# Patient Record
Sex: FEMALE | Race: WHITE | HISPANIC OR LATINO | Employment: FULL TIME | URBAN - METROPOLITAN AREA
[De-identification: names, ages, dates, MRNs, and addresses within clinical notes are randomized per-mention and may not be internally consistent; named-entity substitution may affect disease eponyms.]

---

## 2017-01-19 ENCOUNTER — TELEPHONE (OUTPATIENT)
Dept: FAMILY MEDICINE CLINIC | Facility: CLINIC | Age: 27
End: 2017-01-19

## 2017-01-19 RX ORDER — DEXTROAMPHETAMINE SACCHARATE, AMPHETAMINE ASPARTATE MONOHYDRATE, DEXTROAMPHETAMINE SULFATE AND AMPHETAMINE SULFATE 7.5; 7.5; 7.5; 7.5 MG/1; MG/1; MG/1; MG/1
30 CAPSULE, EXTENDED RELEASE ORAL EVERY MORNING
Qty: 30 CAPSULE | Refills: 0 | Status: SHIPPED | OUTPATIENT
Start: 2017-01-19 | End: 2017-02-13

## 2017-01-31 LAB
AMPHETAMINES UR QL: NEGATIVE
BUPRENORPHINE SERPL-MCNC: NEGATIVE NG/ML
CANNABINOIDS SERPL QL: NEGATIVE
CBC, PLATELET CT, AND DIFF: (no result)
COCAINE SERPL CFM-MCNC: NORMAL NG/ML
EXTERNAL ABO GROUPING: (no result)
EXTERNAL AMPHETAMINE SCREEN URINE: NEGATIVE
EXTERNAL ANTIBODY SCREEN: NORMAL
EXTERNAL BARBITURATE SCREEN URINE: NORMAL
EXTERNAL BENZODIAZEPINE SCREEN URINE: NEGATIVE
EXTERNAL CHLAMYDIA SCREEN: NORMAL
EXTERNAL GC/CHLAMYDIA: NORMAL
EXTERNAL GENETIC TESTING, MATERNAL BLOOD: NORMAL
EXTERNAL GONORRHEA SCREEN: NORMAL
EXTERNAL HEPATITIS B SURFACE ANTIGEN: NEGATIVE
EXTERNAL HEPATITIS C AB: NORMAL
EXTERNAL METHADONE SCREEN URINE: NEGATIVE
EXTERNAL PHENCYCLIDINE SCREEN URINE: NORMAL
EXTERNAL PROPOXYPHENE SCREEN URINE: NORMAL
EXTERNAL RH FACTOR: POSITIVE
EXTERNAL RUBELLA QUALITATIVE: NORMAL
EXTERNAL SYPHILIS RPR SCREEN: NEGATIVE
EXTERNAL URINE CULTURE: NORMAL
HBA1C MFR BLD: 5.1 %
HIV 1+2 AB+HIV1 P24 AG SERPL QL IA: NORMAL
OPIATES UR QL: NEGATIVE
OXYCODONE UR QL SCN: NEGATIVE
RUBV IGG SERPL IA-ACNC: (no result)
TRICYCLICS UR QL SCN: NEGATIVE

## 2017-02-13 ENCOUNTER — OFFICE VISIT (OUTPATIENT)
Dept: FAMILY MEDICINE CLINIC | Facility: CLINIC | Age: 27
End: 2017-02-13

## 2017-02-13 VITALS
HEART RATE: 84 BPM | WEIGHT: 136.1 LBS | HEIGHT: 62 IN | RESPIRATION RATE: 18 BRPM | DIASTOLIC BLOOD PRESSURE: 60 MMHG | OXYGEN SATURATION: 99 % | SYSTOLIC BLOOD PRESSURE: 110 MMHG | BODY MASS INDEX: 25.04 KG/M2 | TEMPERATURE: 98.1 F

## 2017-02-13 DIAGNOSIS — M79.642 PAIN OF LEFT HAND: Primary | ICD-10-CM

## 2017-02-13 PROCEDURE — 99213 OFFICE O/P EST LOW 20 MIN: CPT | Performed by: FAMILY MEDICINE

## 2017-02-13 NOTE — PATIENT INSTRUCTIONS
This is an extremely nice 26-year-old who is here for acute pain in her left hand with inability to extend the left middle finger.  I will request a consult today with our hand specialist, Dr. Toledo.

## 2017-02-13 NOTE — PROGRESS NOTES
Subjective   Lisa Walker is a 26 y.o. female presenting with   Chief Complaint   Patient presents with   • finger issue     left hand fingers staying bent         HPI Comments: This is a 26-year-old  white female nonsmoker who just found out that she is 14 weeks pregnant today.  She tells me for one week she has had pain and inability to extend the left middle finger.  She says it is bad when she first wakes up in the morning and gets a little better throughout the day but is progressively getting worse.  She denies any trauma to the hand and she does not have any fever.  She does not have any paronychia.  She has not had any puncture wounds to the finger.  The full are aspect of the middle finger does not show any intense swelling to suggest he no synovitis, but she does have pain and swelling over the course of the flexor tendons at the metacarpal phalangeal joint       The following portions of the patient's history were reviewed and updated as appropriate: current medications, past family history, past medical history, past social history, past surgical history and problem list.    Review of Systems   Musculoskeletal: Positive for arthralgias and joint swelling.   All other systems reviewed and are negative.      Objective   Physical Exam   Constitutional: She is oriented to person, place, and time. She appears well-developed and well-nourished. No distress.   HENT:   Head: Normocephalic and atraumatic.   Eyes: EOM are normal. Pupils are equal, round, and reactive to light.   Neck: Normal range of motion. Neck supple.   Musculoskeletal: She exhibits edema and tenderness. She exhibits no deformity.        Hands:  Neurological: She is alert and oriented to person, place, and time. No cranial nerve deficit. Coordination normal.   Skin: Skin is warm and dry.   Psychiatric: She has a normal mood and affect. Her behavior is normal.   Nursing note and vitals reviewed.      Assessment/Plan   Lisa was seen  today for finger issue.    Diagnoses and all orders for this visit:    Pain of left hand  -     Ambulatory Referral to Hand Surgery                   I would like him to return for another visit in 1 year(s)

## 2017-02-17 LAB
EXTERNAL THINPREP: NORMAL
HM PAP SMEAR: NORMAL

## 2017-02-20 ENCOUNTER — TELEPHONE (OUTPATIENT)
Dept: FAMILY MEDICINE CLINIC | Facility: CLINIC | Age: 27
End: 2017-02-20

## 2017-02-20 NOTE — TELEPHONE ENCOUNTER
Pt said st her last visit it was discussed to decrease her Adderall to 15 mg due to her being pregnant being and she is wanted to  the rx

## 2017-02-21 NOTE — TELEPHONE ENCOUNTER
asifm informing pt to get us a letter from her OB dr stating it is ok for her to continue to take the Adderall while she is pregnant

## 2017-03-06 ENCOUNTER — TELEPHONE (OUTPATIENT)
Dept: FAMILY MEDICINE CLINIC | Facility: CLINIC | Age: 27
End: 2017-03-06

## 2017-03-06 RX ORDER — OSELTAMIVIR PHOSPHATE 75 MG/1
75 CAPSULE ORAL 2 TIMES DAILY
Qty: 10 CAPSULE | Refills: 0 | Status: SHIPPED | OUTPATIENT
Start: 2017-03-06 | End: 2017-04-17

## 2017-03-06 NOTE — TELEPHONE ENCOUNTER
Pt states she is 16 wks pregnant and her boyfriend was just diagnosed with the Flu and she is asking if she can have a rx for Tamiflu.   She said she called her OB dr and she is vacation

## 2017-04-27 ENCOUNTER — ROUTINE PRENATAL (OUTPATIENT)
Dept: OBSTETRICS AND GYNECOLOGY | Facility: CLINIC | Age: 27
End: 2017-04-27

## 2017-04-27 VITALS — DIASTOLIC BLOOD PRESSURE: 60 MMHG | WEIGHT: 158 LBS | BODY MASS INDEX: 28.9 KG/M2 | SYSTOLIC BLOOD PRESSURE: 112 MMHG

## 2017-04-27 DIAGNOSIS — Z34.92 NORMAL PREGNANCY, SECOND TRIMESTER: Primary | ICD-10-CM

## 2017-04-27 DIAGNOSIS — Z13.9 SCREENING: ICD-10-CM

## 2017-04-27 LAB
BILIRUB BLD-MCNC: NEGATIVE MG/DL
CLARITY, POC: CLEAR
COLOR UR: YELLOW
GLUCOSE UR STRIP-MCNC: NEGATIVE MG/DL
KETONES UR QL: NEGATIVE
LEUKOCYTE EST, POC: NEGATIVE
NITRITE UR-MCNC: NEGATIVE MG/ML
PH UR: 5 [PH] (ref 5–8)
PROT UR STRIP-MCNC: NEGATIVE MG/DL
RBC # UR STRIP: NEGATIVE /UL
SP GR UR: 1.01 (ref 1–1.03)
UROBILINOGEN UR QL: NORMAL

## 2017-04-27 PROCEDURE — 81002 URINALYSIS NONAUTO W/O SCOPE: CPT | Performed by: OBSTETRICS & GYNECOLOGY

## 2017-04-27 PROCEDURE — 0502F SUBSEQUENT PRENATAL CARE: CPT | Performed by: OBSTETRICS & GYNECOLOGY

## 2017-04-27 NOTE — PROGRESS NOTES
Chief Complaint   Patient presents with   • Routine Prenatal Visit   Complains of HA, tylenol helps some  Exam as above  IMP: IUP at 24 weeks  Plan:  2 HR GTT next visit

## 2017-05-25 ENCOUNTER — ROUTINE PRENATAL (OUTPATIENT)
Dept: OBSTETRICS AND GYNECOLOGY | Facility: CLINIC | Age: 27
End: 2017-05-25

## 2017-05-25 VITALS — WEIGHT: 162 LBS | BODY MASS INDEX: 29.63 KG/M2 | SYSTOLIC BLOOD PRESSURE: 118 MMHG | DIASTOLIC BLOOD PRESSURE: 60 MMHG

## 2017-05-25 DIAGNOSIS — Z3A.28 28 WEEKS GESTATION OF PREGNANCY: Primary | ICD-10-CM

## 2017-05-25 DIAGNOSIS — Z34.93 NORMAL PREGNANCY, THIRD TRIMESTER: ICD-10-CM

## 2017-05-25 LAB
GLUCOSE 1H P 75 G GLC PO SERPL-MCNC: 93 MG/DL
GLUCOSE 2H P 75 G GLC PO SERPL-MCNC: 96 MG/DL
GLUCOSE P FAST SERPL-MCNC: 74 MG/DL
HCT VFR BLD AUTO: 34.1 % (ref 35.6–45.5)
HGB BLD-MCNC: 11.3 G/DL (ref 11.9–15.5)

## 2017-05-25 PROCEDURE — 0502F SUBSEQUENT PRENATAL CARE: CPT | Performed by: OBSTETRICS & GYNECOLOGY

## 2017-06-08 ENCOUNTER — ROUTINE PRENATAL (OUTPATIENT)
Dept: OBSTETRICS AND GYNECOLOGY | Facility: CLINIC | Age: 27
End: 2017-06-08

## 2017-06-08 VITALS — BODY MASS INDEX: 29.81 KG/M2 | WEIGHT: 163 LBS | SYSTOLIC BLOOD PRESSURE: 118 MMHG | DIASTOLIC BLOOD PRESSURE: 60 MMHG

## 2017-06-08 DIAGNOSIS — Z13.9 SCREENING: ICD-10-CM

## 2017-06-08 DIAGNOSIS — Z34.92 NORMAL PREGNANCY, SECOND TRIMESTER: Primary | ICD-10-CM

## 2017-06-08 LAB
BILIRUB BLD-MCNC: NEGATIVE MG/DL
CLARITY, POC: CLEAR
COLOR UR: YELLOW
GLUCOSE UR STRIP-MCNC: NEGATIVE MG/DL
KETONES UR QL: NEGATIVE
LEUKOCYTE EST, POC: NEGATIVE
NITRITE UR-MCNC: NEGATIVE MG/ML
PH UR: 7 [PH] (ref 5–8)
PROT UR STRIP-MCNC: NEGATIVE MG/DL
RBC # UR STRIP: NEGATIVE /UL
SP GR UR: 1.01 (ref 1–1.03)
UROBILINOGEN UR QL: NORMAL

## 2017-06-08 PROCEDURE — 0502F SUBSEQUENT PRENATAL CARE: CPT | Performed by: OBSTETRICS & GYNECOLOGY

## 2017-06-08 PROCEDURE — 81002 URINALYSIS NONAUTO W/O SCOPE: CPT | Performed by: OBSTETRICS & GYNECOLOGY

## 2017-06-08 NOTE — PROGRESS NOTES
OB follow up     Lisa Walker is a 26 y.o.  30w2d being seen today for her obstetrical visit.  Patient reports no complaints. Fetal movement: normal.    Review of Systems  No bleeding, No cramping/contractions     /60  Wt 163 lb (73.9 kg)  LMP 2016  BMI 29.81 kg/m2    FHT:   BPM   Uterine Size:         Assessment/Plan:    1) 26 y.o.  -pregnancy at 30w2d  Reviewed this stage of pregnancy  Problem list updated   Return in about 2 weeks (around 2017) for OB Tummy.      Ethan Myers MD    2017  3:50 PM

## 2017-06-27 ENCOUNTER — ROUTINE PRENATAL (OUTPATIENT)
Dept: OBSTETRICS AND GYNECOLOGY | Facility: CLINIC | Age: 27
End: 2017-06-27

## 2017-06-27 VITALS — BODY MASS INDEX: 29.81 KG/M2 | WEIGHT: 163 LBS | SYSTOLIC BLOOD PRESSURE: 110 MMHG | DIASTOLIC BLOOD PRESSURE: 70 MMHG

## 2017-06-27 DIAGNOSIS — Z13.9 SCREENING: Primary | ICD-10-CM

## 2017-06-27 PROCEDURE — 0502F SUBSEQUENT PRENATAL CARE: CPT | Performed by: OBSTETRICS & GYNECOLOGY

## 2017-06-28 NOTE — PROGRESS NOTES
OB follow up     Chief Complaint: PNC Fu    Lisa Walker is a 26 y.o.  33w1d being seen today for her obstetrical visit.  Patient reports no complaints. Fetal movement: normal. Reporting some LE swelling. Pt working full time. States swelling is worse at end of day    Review of Systems  No bleeding, No cramping/contractions     /70  Wt 163 lb (73.9 kg)  LMP 2016  BMI 29.81 kg/m2    FHT: present   Uterine Size: 33cm       Assessment    1) pregnancy at 33w1d    2) LE swelling: mild swelling of feet and calf noted. Discussed support hose and lifting legs above level of heart.      Plan    Reviewed this stage of pregnancy  Problem list updated   Return in about 2 weeks (around 2017).      Carlita Holt, DO    2017  1:11 PM

## 2017-07-13 ENCOUNTER — ROUTINE PRENATAL (OUTPATIENT)
Dept: OBSTETRICS AND GYNECOLOGY | Facility: CLINIC | Age: 27
End: 2017-07-13

## 2017-07-13 ENCOUNTER — PROCEDURE VISIT (OUTPATIENT)
Dept: OBSTETRICS AND GYNECOLOGY | Facility: CLINIC | Age: 27
End: 2017-07-13

## 2017-07-13 VITALS — SYSTOLIC BLOOD PRESSURE: 112 MMHG | WEIGHT: 170 LBS | BODY MASS INDEX: 31.09 KG/M2 | DIASTOLIC BLOOD PRESSURE: 70 MMHG

## 2017-07-13 DIAGNOSIS — O32.0XX0: Primary | ICD-10-CM

## 2017-07-13 DIAGNOSIS — O32.8XX0: ICD-10-CM

## 2017-07-13 DIAGNOSIS — Z34.93 NORMAL PREGNANCY, THIRD TRIMESTER: Primary | ICD-10-CM

## 2017-07-13 PROCEDURE — 0502F SUBSEQUENT PRENATAL CARE: CPT | Performed by: OBSTETRICS & GYNECOLOGY

## 2017-07-13 PROCEDURE — 76816 OB US FOLLOW-UP PER FETUS: CPT | Performed by: OBSTETRICS & GYNECOLOGY

## 2017-07-13 NOTE — PROGRESS NOTES
OB follow up     Lisa Walker is a 26 y.o.  35w2d being seen today for her obstetrical visit.  Patient reports no complaints. Fetal movement: normal.    Review of Systems  No bleeding, No cramping/contractions     /70  Wt 170 lb (77.1 kg)  LMP 2016  BMI 31.09 kg/m2    FHT: present BPM   Uterine Size:       Abdomen is soft, nontender.  Cervical exam was done today and no presenting part was felt.  GBS was collected.    Assessment/Plan:    1) 26 y.o.  -pregnancy at 35w2d  2) No presenting part - check US for position today  3) GBS was collected and sent    Reviewed this stage of pregnancy, labor warnings reviewed.   Problem list updated   Return in about 7 days (around 2017) for OB INT.      Ethan Myers MD    2017  10:14 AM

## 2017-07-17 LAB — B-HEM STREP SPEC QL CULT: NEGATIVE

## 2017-07-20 ENCOUNTER — ROUTINE PRENATAL (OUTPATIENT)
Dept: OBSTETRICS AND GYNECOLOGY | Facility: CLINIC | Age: 27
End: 2017-07-20

## 2017-07-20 VITALS — DIASTOLIC BLOOD PRESSURE: 64 MMHG | BODY MASS INDEX: 30.91 KG/M2 | SYSTOLIC BLOOD PRESSURE: 112 MMHG | WEIGHT: 169 LBS

## 2017-07-20 DIAGNOSIS — Z34.93 NORMAL PREGNANCY, THIRD TRIMESTER: Primary | ICD-10-CM

## 2017-07-20 PROCEDURE — 0502F SUBSEQUENT PRENATAL CARE: CPT | Performed by: OBSTETRICS & GYNECOLOGY

## 2017-07-20 NOTE — PROGRESS NOTES
OB follow up     Lisa Walker is a 26 y.o.  36w2d being seen today for her obstetrical visit.  Patient reports no complaints. Fetal movement: normal.    Review of Systems  No bleeding, No cramping/contractions     /64  Wt 169 lb (76.7 kg)  LMP 2016  BMI 30.91 kg/m2    FHT: 150 BPM   Uterine Size: 36 cm     GBS is negative    Assessment/Plan:    1) 26 y.o.  -pregnancy at 36w2d  Reviewed this stage of pregnancy  Problem list updated   Return in about 7 days (around 2017) for OB INT.      Ethan Myers MD    2017  4:29 PM

## 2017-08-01 ENCOUNTER — ROUTINE PRENATAL (OUTPATIENT)
Dept: OBSTETRICS AND GYNECOLOGY | Facility: CLINIC | Age: 27
End: 2017-08-01

## 2017-08-01 VITALS — BODY MASS INDEX: 31.09 KG/M2 | WEIGHT: 170 LBS | DIASTOLIC BLOOD PRESSURE: 72 MMHG | SYSTOLIC BLOOD PRESSURE: 100 MMHG

## 2017-08-01 DIAGNOSIS — Z34.93 NORMAL PREGNANCY, THIRD TRIMESTER: Primary | ICD-10-CM

## 2017-08-01 PROCEDURE — 0502F SUBSEQUENT PRENATAL CARE: CPT | Performed by: OBSTETRICS & GYNECOLOGY

## 2017-08-01 NOTE — PROGRESS NOTES
OB follow up     Lisa Walker is a 26 y.o.  38w0d being seen today for her obstetrical visit.  Patient reports no complaints. Fetal movement: normal.    Review of Systems  No bleeding, No cramping/contractions     /72  Wt 170 lb (77.1 kg)  LMP 2016  BMI 31.09 kg/m2    FHT: 150 BPM   Uterine Size: 38 cm       Assessment/Plan:    1) 26 y.o.  -pregnancy at 38w0d  Reviewed this stage of pregnancy  Problem list updated   Return in about 7 days (around 2017) for OB INT.      Ethan Myers MD    2017  4:30 PM

## 2017-08-10 ENCOUNTER — ROUTINE PRENATAL (OUTPATIENT)
Dept: OBSTETRICS AND GYNECOLOGY | Facility: CLINIC | Age: 27
End: 2017-08-10

## 2017-08-10 VITALS — BODY MASS INDEX: 30.73 KG/M2 | SYSTOLIC BLOOD PRESSURE: 102 MMHG | DIASTOLIC BLOOD PRESSURE: 64 MMHG | WEIGHT: 168 LBS

## 2017-08-10 DIAGNOSIS — Z34.93 NORMAL PREGNANCY, THIRD TRIMESTER: Primary | ICD-10-CM

## 2017-08-10 PROCEDURE — 0502F SUBSEQUENT PRENATAL CARE: CPT | Performed by: OBSTETRICS & GYNECOLOGY

## 2017-08-10 NOTE — PROGRESS NOTES
OB follow up     Lisa Walker is a 26 y.o.  39w2d being seen today for her obstetrical visit.  Patient reports occasional contractions. Fetal movement: normal.    Review of Systems  No bleeding, No cramping/contractions     /64  Wt 168 lb (76.2 kg)  LMP 2016  BMI 30.73 kg/m2    FHT: present BPM   Uterine Size: 38 cm       Assessment/Plan:    1) 26 y.o.  -pregnancy at 39w2d    Reviewed this stage of pregnancy  Problem list updated   Return in about 7 days (around 2017) for OB INT.      Ethan Myers MD    8/10/2017  11:14 AM

## 2017-08-17 ENCOUNTER — HISTORY (OUTPATIENT)
Dept: OBSTETRICS AND GYNECOLOGY | Facility: HOSPITAL | Age: 27
End: 2017-08-17
Attending: OBSTETRICS & GYNECOLOGY

## 2017-08-17 ENCOUNTER — ROUTINE PRENATAL (OUTPATIENT)
Dept: OBSTETRICS AND GYNECOLOGY | Facility: CLINIC | Age: 27
End: 2017-08-17

## 2017-08-17 ENCOUNTER — HOSPITAL ENCOUNTER (OUTPATIENT)
Facility: HOSPITAL | Age: 27
Discharge: HOME | End: 2017-08-17
Attending: OBSTETRICS & GYNECOLOGY | Admitting: OBSTETRICS & GYNECOLOGY

## 2017-08-17 VITALS
RESPIRATION RATE: 18 BRPM | DIASTOLIC BLOOD PRESSURE: 62 MMHG | HEART RATE: 85 BPM | SYSTOLIC BLOOD PRESSURE: 113 MMHG | TEMPERATURE: 98.4 F

## 2017-08-17 VITALS — WEIGHT: 170 LBS | DIASTOLIC BLOOD PRESSURE: 78 MMHG | SYSTOLIC BLOOD PRESSURE: 104 MMHG | BODY MASS INDEX: 31.09 KG/M2

## 2017-08-17 DIAGNOSIS — Z13.9 SCREENING: ICD-10-CM

## 2017-08-17 DIAGNOSIS — Z34.93 NORMAL PREGNANCY, THIRD TRIMESTER: Primary | ICD-10-CM

## 2017-08-17 LAB
BILIRUB BLD-MCNC: NEGATIVE MG/DL
BILIRUB BLD-MCNC: NEGATIVE MG/DL
CLARITY, POC: CLEAR
CLARITY, POC: CLEAR
COLOR UR: YELLOW
COLOR UR: YELLOW
GLUCOSE UR STRIP-MCNC: NEGATIVE MG/DL
GLUCOSE UR STRIP-MCNC: NEGATIVE MG/DL
KETONES UR QL: ABNORMAL
KETONES UR QL: NEGATIVE
LEUKOCYTE EST, POC: NEGATIVE
LEUKOCYTE EST, POC: NEGATIVE
NITRITE UR-MCNC: NEGATIVE MG/ML
NITRITE UR-MCNC: NEGATIVE MG/ML
PH UR: 6 [PH] (ref 5–8)
PROT UR STRIP-MCNC: NEGATIVE MG/DL
PROT UR STRIP-MCNC: NEGATIVE MG/DL
RBC # UR STRIP: NEGATIVE /UL
RBC # UR STRIP: NEGATIVE /UL
SP GR UR: 1.01 (ref 1–1.03)
UROBILINOGEN UR QL: NORMAL
UROBILINOGEN UR QL: NORMAL

## 2017-08-17 PROCEDURE — 59025 FETAL NON-STRESS TEST: CPT

## 2017-08-17 PROCEDURE — 59426 ANTEPARTUM CARE ONLY: CPT | Performed by: OBSTETRICS & GYNECOLOGY

## 2017-08-17 PROCEDURE — 81002 URINALYSIS NONAUTO W/O SCOPE: CPT | Performed by: OBSTETRICS & GYNECOLOGY

## 2017-08-17 PROCEDURE — 59025 FETAL NON-STRESS TEST: CPT | Performed by: OBSTETRICS & GYNECOLOGY

## 2017-08-17 PROCEDURE — G0463 HOSPITAL OUTPT CLINIC VISIT: HCPCS

## 2017-08-17 NOTE — PROGRESS NOTES
OB follow up     Lisa Walker is a 26 y.o.  40w2d being seen today for her obstetrical visit.  Patient reports no bleeding, no leaking and occasional contractions. Fetal movement: normal.    Review of Systems  No bleeding, No cramping/contractions     /78  Wt 170 lb (77.1 kg)  LMP 2016  BMI 31.09 kg/m2    FHT: present BPM   Uterine Size: 38 cm       Assessment/Plan:    1) 26 y.o.  -pregnancy at 40w2d    2) IOL > 41 weeks  Encounter Diagnoses   Name Primary?   • Normal pregnancy, third trimester Yes   • Screening        3) Reviewed this stage of pregnancy  4) Problem list updated     No Follow-up on file.      Ethan Myers MD    2017  1:15 PM

## 2017-08-17 NOTE — NON STRESS TEST
Lisa Walker, a  at 40w2d with an JAEL of 8/15/2017, by Ultrasound, was seen at [facility] for a nonstress test. SVE 3, membranes intact.  Chief Complaint   Patient presents with   • Contractions   • leaking   • leaking fluid       Interpretation A  Nonstress Test Interpretation A: Reactive (17 1056 : Syeda Ny RN)        Lisa Walker  41w1d  Problem List Items Addressed This Visit     None          Chief Complaint   Patient presents with   • Contractions   • leaking   • leaking fluid        HPI:  Pt thinks she  her water earlier today.  HPI         Patient Active Problem List   Diagnosis   • Attention or concentration deficit   • Pain of left hand   • Vaginal delivery           Review of Systems -   Psychological ROS: negative  Ophthalmic ROS: negative  ENT ROS: negative  Allergy and Immunology ROS: negative  Hematological and Lymphatic ROS: negative  Endocrine ROS: negative  Breast ROS: negative for breast lumps  Respiratory ROS: no cough, shortness of breath, or wheezing  Cardiovascular ROS: no chest pain or dyspnea on exertion  Gastrointestinal ROS: no abdominal pain, change in bowel habits, or black or bloody stools  Genito-Urinary ROS: no dysuria, trouble voiding, or hematuria  Musculoskeletal ROS: negative  Neurological ROS: no TIA or stroke symptoms  Dermatological ROS: negative        NST INTERPRETATION  Indication for test: labor  Interpretation: reactive  Fetal Heart Rate Baseline: 140's  Periodic Changes: present  Contractions present? rare        Physical Examination: General appearance - alert, well appearing, and in no distress  Mental status - alert, oriented to person, place, and time  Abdomen - soft, nontender, nondistended, no masses or organomegaly  Neurological - alert, oriented, normal speech, no focal findings or movement disorder noted  Musculoskeletal - no joint tenderness, deformity or swelling      CERVICAL EXAM: as  above    Resume normal activity as tolerated.    Please keep your next regularly scheduled appointment.    Call your doctor if you notice: increased leakage or fluid from the vagina, contractions more than 10 per 1 hour, decreased fetal movement, low back pain or cramping that doesn't go away, bleeding from vaginal area, difficulty peeing, pain with peeing, difficulty breathing, new calf pain, headache that doesn't go away or vision change.    Normal diet as tolerated.    Perform a kick count once a day at approximately the same time each day. Baby's activity levels are usually higher in the evening after dinner.To perform a kick count, lie on your left side and focus on your baby's movements: rolls, kicks, or flutters. Record the number of minutes it takes to feel your baby move ten times. You may stop counting after your baby has moved 5 times.    If your baby does not move at least 5 times in 1 hr or if there is a sudden decrease in movement, call the office ([phone])    Patient Active Problem List   Diagnosis   • Attention or concentration deficit   • Pain of left hand   • Vaginal delivery       [unfilled]    IMP: false labor    No diagnosis found.    PLAN: RETURN TO OFFICE AS SCHEDULED, CALL FOR PROBLEMS.    For billing purposes 15 out of 15 minutes spent counseling face to face with pt explaining signs and symptoms of labor, when to call; instructions and precautions given.     Derek Charles MD  9/3/2017  8:19 PM

## 2017-08-17 NOTE — PROGRESS NOTES
CC:  Went to hospital this morning, had leakage and thought her water had broke.  Sent home after evaluation, 1 cm dilated. rlr

## 2017-08-18 ENCOUNTER — HOSPITAL ENCOUNTER (OUTPATIENT)
Facility: HOSPITAL | Age: 27
Discharge: HOME | End: 2017-08-18
Attending: OBSTETRICS & GYNECOLOGY | Admitting: OBSTETRICS & GYNECOLOGY

## 2017-08-18 ENCOUNTER — HOSPITAL ENCOUNTER (OUTPATIENT)
Dept: LABOR AND DELIVERY | Facility: HOSPITAL | Age: 27
Discharge: HOME | End: 2017-08-18

## 2017-08-18 VITALS
SYSTOLIC BLOOD PRESSURE: 100 MMHG | HEART RATE: 63 BPM | RESPIRATION RATE: 18 BRPM | DIASTOLIC BLOOD PRESSURE: 57 MMHG | BODY MASS INDEX: 31.28 KG/M2 | WEIGHT: 170 LBS | TEMPERATURE: 98 F | OXYGEN SATURATION: 99 % | HEIGHT: 62 IN

## 2017-08-18 PROBLEM — Z37.9 NORMAL LABOR: Status: ACTIVE | Noted: 2017-08-18

## 2017-08-18 LAB
ABO GROUP BLD: NORMAL
ABO GROUP BLD: NORMAL
BLD GP AB SCN SERPL QL: NEGATIVE
DEPRECATED RDW RBC AUTO: 45.2 FL (ref 37–54)
ERYTHROCYTE [DISTWIDTH] IN BLOOD BY AUTOMATED COUNT: 13.2 % (ref 11.5–14.5)
HCT VFR BLD AUTO: 36.6 % (ref 37–47)
HGB BLD-MCNC: 12.3 G/DL (ref 12–16)
MCH RBC QN AUTO: 31.3 PG (ref 27–31)
MCHC RBC AUTO-ENTMCNC: 33.6 G/DL (ref 31–37)
MCV RBC AUTO: 93.1 FL (ref 81–99)
PLATELET # BLD AUTO: 202 10*3/MM3 (ref 140–500)
PMV BLD AUTO: 12.3 FL (ref 7.4–10.4)
RBC # BLD AUTO: 3.93 10*6/MM3 (ref 4.2–5.4)
RH BLD: POSITIVE
RH BLD: POSITIVE
WBC NRBC COR # BLD: 9.73 10*3/MM3 (ref 4.8–10.8)

## 2017-08-18 PROCEDURE — 86901 BLOOD TYPING SEROLOGIC RH(D): CPT

## 2017-08-18 PROCEDURE — 36415 COLL VENOUS BLD VENIPUNCTURE: CPT | Performed by: OBSTETRICS & GYNECOLOGY

## 2017-08-18 PROCEDURE — 59025 FETAL NON-STRESS TEST: CPT

## 2017-08-18 PROCEDURE — 96361 HYDRATE IV INFUSION ADD-ON: CPT

## 2017-08-18 PROCEDURE — 85027 COMPLETE CBC AUTOMATED: CPT | Performed by: OBSTETRICS & GYNECOLOGY

## 2017-08-18 PROCEDURE — 96360 HYDRATION IV INFUSION INIT: CPT

## 2017-08-18 PROCEDURE — 0502F SUBSEQUENT PRENATAL CARE: CPT | Performed by: OBSTETRICS & GYNECOLOGY

## 2017-08-18 PROCEDURE — 86900 BLOOD TYPING SEROLOGIC ABO: CPT | Performed by: OBSTETRICS & GYNECOLOGY

## 2017-08-18 PROCEDURE — 86850 RBC ANTIBODY SCREEN: CPT | Performed by: OBSTETRICS & GYNECOLOGY

## 2017-08-18 PROCEDURE — G0463 HOSPITAL OUTPT CLINIC VISIT: HCPCS

## 2017-08-18 PROCEDURE — 86901 BLOOD TYPING SEROLOGIC RH(D): CPT | Performed by: OBSTETRICS & GYNECOLOGY

## 2017-08-18 PROCEDURE — 86900 BLOOD TYPING SEROLOGIC ABO: CPT

## 2017-08-18 RX ORDER — OXYTOCIN/RINGER'S LACTATE 20/1000 ML
2 PLASTIC BAG, INJECTION (ML) INTRAVENOUS CONTINUOUS
Status: CANCELLED | OUTPATIENT
Start: 2017-08-18

## 2017-08-18 RX ORDER — SODIUM CHLORIDE 0.9 % (FLUSH) 0.9 %
1-10 SYRINGE (ML) INJECTION AS NEEDED
Status: DISCONTINUED | OUTPATIENT
Start: 2017-08-18 | End: 2017-08-18

## 2017-08-18 RX ORDER — SODIUM CHLORIDE, SODIUM LACTATE, POTASSIUM CHLORIDE, CALCIUM CHLORIDE 600; 310; 30; 20 MG/100ML; MG/100ML; MG/100ML; MG/100ML
125 INJECTION, SOLUTION INTRAVENOUS CONTINUOUS
Status: DISCONTINUED | OUTPATIENT
Start: 2017-08-18 | End: 2017-08-18

## 2017-08-18 RX ORDER — LIDOCAINE HYDROCHLORIDE 10 MG/ML
5 INJECTION, SOLUTION INFILTRATION; PERINEURAL AS NEEDED
Status: DISCONTINUED | OUTPATIENT
Start: 2017-08-18 | End: 2017-08-18

## 2017-08-18 RX ORDER — MISOPROSTOL 100 UG/1
800 TABLET ORAL AS NEEDED
Status: CANCELLED | OUTPATIENT
Start: 2017-08-18

## 2017-08-18 RX ORDER — METHYLERGONOVINE MALEATE 0.2 MG/ML
200 INJECTION INTRAVENOUS ONCE AS NEEDED
Status: CANCELLED | OUTPATIENT
Start: 2017-08-18

## 2017-08-18 RX ORDER — CARBOPROST TROMETHAMINE 250 UG/ML
250 INJECTION, SOLUTION INTRAMUSCULAR AS NEEDED
Status: CANCELLED | OUTPATIENT
Start: 2017-08-18

## 2017-08-18 RX ORDER — OXYTOCIN/RINGER'S LACTATE 20/1000 ML
2-30 PLASTIC BAG, INJECTION (ML) INTRAVENOUS
Status: DISCONTINUED | OUTPATIENT
Start: 2017-08-18 | End: 2017-08-18

## 2017-08-18 RX ADMIN — SODIUM CHLORIDE, POTASSIUM CHLORIDE, SODIUM LACTATE AND CALCIUM CHLORIDE 125 ML/HR: 600; 310; 30; 20 INJECTION, SOLUTION INTRAVENOUS at 05:49

## 2017-08-19 ENCOUNTER — HOSPITAL ENCOUNTER (OUTPATIENT)
Facility: HOSPITAL | Age: 27
Discharge: HOME | End: 2017-08-19
Attending: OBSTETRICS & GYNECOLOGY | Admitting: OBSTETRICS & GYNECOLOGY

## 2017-08-19 VITALS
HEIGHT: 62 IN | TEMPERATURE: 98.3 F | WEIGHT: 170 LBS | OXYGEN SATURATION: 99 % | BODY MASS INDEX: 31.28 KG/M2 | HEART RATE: 67 BPM | RESPIRATION RATE: 18 BRPM

## 2017-08-19 PROCEDURE — G0463 HOSPITAL OUTPT CLINIC VISIT: HCPCS

## 2017-08-19 PROCEDURE — 59025 FETAL NON-STRESS TEST: CPT

## 2017-08-19 PROCEDURE — 59025 FETAL NON-STRESS TEST: CPT | Performed by: OBSTETRICS & GYNECOLOGY

## 2017-08-19 NOTE — NURSING NOTE
Dr. Hardy said to discharge pt to home with labor warning signs. Pt to have scheduled induction on Wednesday.

## 2017-08-19 NOTE — NURSING NOTE
Pt discharge to home undelivered. Pt given discharge instructions with labor warning signs. Pt to have scheduled induction on Wednesday.

## 2017-08-19 NOTE — NON STRESS TEST
Lisa Walker, a  at 40w4d with an JAEL of 8/15/2017, by Ultrasound, was seen at [facility] for a nonstress test.    Chief Complaint   Patient presents with   • leaking of fluid     Pt here for rule out rupture of membranes. nitrazine negative. Vag exam 1cm/20/-2 and posterior. Pt had an occasional contx and uterine irritability while on fetal monitor.  with accels and reactive.  Pt desires to go natural but is scheduled for induction on Wednesday. Pt discharged to home undelivered.  Interpretation A  Nonstress Test Interpretation A: Reactive (17 0940 : Yael Raphael RN)

## 2017-08-23 ENCOUNTER — HOSPITAL ENCOUNTER (OUTPATIENT)
Dept: LABOR AND DELIVERY | Facility: HOSPITAL | Age: 27
Discharge: HOME | End: 2017-08-23

## 2017-08-23 ENCOUNTER — ANESTHESIA (OUTPATIENT)
Dept: OBSTETRICS AND GYNECOLOGY | Facility: HOSPITAL | Age: 27
End: 2017-08-23

## 2017-08-23 ENCOUNTER — HOSPITAL ENCOUNTER (INPATIENT)
Facility: HOSPITAL | Age: 27
LOS: 2 days | Discharge: HOME | End: 2017-08-25
Attending: OBSTETRICS & GYNECOLOGY | Admitting: OBSTETRICS & GYNECOLOGY

## 2017-08-23 ENCOUNTER — ANESTHESIA EVENT (OUTPATIENT)
Dept: OBSTETRICS AND GYNECOLOGY | Facility: HOSPITAL | Age: 27
End: 2017-08-23

## 2017-08-23 LAB
ABO GROUP BLD: NORMAL
BLD GP AB SCN SERPL QL: NEGATIVE
DEPRECATED RDW RBC AUTO: 45.1 FL (ref 37–54)
ERYTHROCYTE [DISTWIDTH] IN BLOOD BY AUTOMATED COUNT: 13.2 % (ref 11.5–14.5)
HCT VFR BLD AUTO: 37.4 % (ref 37–47)
HGB BLD-MCNC: 12.5 G/DL (ref 12–16)
MCH RBC QN AUTO: 31.3 PG (ref 27–31)
MCHC RBC AUTO-ENTMCNC: 33.4 G/DL (ref 31–37)
MCV RBC AUTO: 93.7 FL (ref 81–99)
PLATELET # BLD AUTO: 202 10*3/MM3 (ref 140–500)
PMV BLD AUTO: 12.2 FL (ref 7.4–10.4)
RBC # BLD AUTO: 3.99 10*6/MM3 (ref 4.2–5.4)
RH BLD: POSITIVE
WBC NRBC COR # BLD: 9.81 10*3/MM3 (ref 4.8–10.8)

## 2017-08-23 PROCEDURE — 86850 RBC ANTIBODY SCREEN: CPT | Performed by: OBSTETRICS & GYNECOLOGY

## 2017-08-23 PROCEDURE — C1755 CATHETER, INTRASPINAL: HCPCS | Performed by: NURSE ANESTHETIST, CERTIFIED REGISTERED

## 2017-08-23 PROCEDURE — 86901 BLOOD TYPING SEROLOGIC RH(D): CPT | Performed by: OBSTETRICS & GYNECOLOGY

## 2017-08-23 PROCEDURE — 86900 BLOOD TYPING SEROLOGIC ABO: CPT | Performed by: OBSTETRICS & GYNECOLOGY

## 2017-08-23 PROCEDURE — 99024 POSTOP FOLLOW-UP VISIT: CPT | Performed by: OBSTETRICS & GYNECOLOGY

## 2017-08-23 PROCEDURE — 59410 OBSTETRICAL CARE: CPT | Performed by: OBSTETRICS & GYNECOLOGY

## 2017-08-23 PROCEDURE — 85027 COMPLETE CBC AUTOMATED: CPT | Performed by: OBSTETRICS & GYNECOLOGY

## 2017-08-23 PROCEDURE — 25010000002 FENTANYL CITRATE (PF) 100 MCG/2ML SOLUTION: Performed by: OBSTETRICS & GYNECOLOGY

## 2017-08-23 PROCEDURE — 0KQM0ZZ REPAIR PERINEUM MUSCLE, OPEN APPROACH: ICD-10-PCS | Performed by: OBSTETRICS & GYNECOLOGY

## 2017-08-23 RX ORDER — OXYTOCIN/RINGER'S LACTATE 20/1000 ML
PLASTIC BAG, INJECTION (ML) INTRAVENOUS
Status: COMPLETED
Start: 2017-08-23 | End: 2017-08-23

## 2017-08-23 RX ORDER — MAGNESIUM CARB/ALUMINUM HYDROX 105-160MG
TABLET,CHEWABLE ORAL
Status: DISPENSED
Start: 2017-08-23 | End: 2017-08-24

## 2017-08-23 RX ORDER — LIDOCAINE HYDROCHLORIDE AND EPINEPHRINE 15; 5 MG/ML; UG/ML
INJECTION, SOLUTION EPIDURAL AS NEEDED
Status: DISCONTINUED | OUTPATIENT
Start: 2017-08-23 | End: 2017-08-24 | Stop reason: SURG

## 2017-08-23 RX ORDER — FENTANYL CITRATE 50 UG/ML
100 INJECTION, SOLUTION INTRAMUSCULAR; INTRAVENOUS
Status: DISCONTINUED | OUTPATIENT
Start: 2017-08-23 | End: 2017-08-24

## 2017-08-23 RX ORDER — SODIUM CHLORIDE 0.9 % (FLUSH) 0.9 %
1-10 SYRINGE (ML) INJECTION AS NEEDED
Status: DISCONTINUED | OUTPATIENT
Start: 2017-08-23 | End: 2017-08-24

## 2017-08-23 RX ORDER — OXYTOCIN/RINGER'S LACTATE 20/1000 ML
2 PLASTIC BAG, INJECTION (ML) INTRAVENOUS
Status: DISCONTINUED | OUTPATIENT
Start: 2017-08-23 | End: 2017-08-24

## 2017-08-23 RX ORDER — LIDOCAINE HYDROCHLORIDE 10 MG/ML
5 INJECTION, SOLUTION INFILTRATION; PERINEURAL AS NEEDED
Status: DISCONTINUED | OUTPATIENT
Start: 2017-08-23 | End: 2017-08-24

## 2017-08-23 RX ORDER — SUFENTANIL CITRATE 50 UG/ML
INJECTION EPIDURAL; INTRAVENOUS
Status: DISPENSED
Start: 2017-08-23 | End: 2017-08-24

## 2017-08-23 RX ORDER — SODIUM CHLORIDE, SODIUM LACTATE, POTASSIUM CHLORIDE, CALCIUM CHLORIDE 600; 310; 30; 20 MG/100ML; MG/100ML; MG/100ML; MG/100ML
125 INJECTION, SOLUTION INTRAVENOUS CONTINUOUS
Status: DISCONTINUED | OUTPATIENT
Start: 2017-08-23 | End: 2017-08-24

## 2017-08-23 RX ADMIN — Medication 2 MILLI-UNITS/MIN: at 06:45

## 2017-08-23 RX ADMIN — SODIUM CHLORIDE, POTASSIUM CHLORIDE, SODIUM LACTATE AND CALCIUM CHLORIDE 101 ML/HR: 600; 310; 30; 20 INJECTION, SOLUTION INTRAVENOUS at 13:41

## 2017-08-23 RX ADMIN — LIDOCAINE HYDROCHLORIDE,EPINEPHRINE BITARTRATE 2 ML: 15; .005 INJECTION, SOLUTION EPIDURAL; INFILTRATION; INTRACAUDAL; PERINEURAL at 15:45

## 2017-08-23 RX ADMIN — FENTANYL CITRATE 100 MCG: 50 INJECTION, SOLUTION INTRAMUSCULAR; INTRAVENOUS at 12:15

## 2017-08-23 RX ADMIN — FENTANYL CITRATE 100 MCG: 50 INJECTION, SOLUTION INTRAMUSCULAR; INTRAVENOUS at 14:43

## 2017-08-23 RX ADMIN — SODIUM CHLORIDE, POTASSIUM CHLORIDE, SODIUM LACTATE AND CALCIUM CHLORIDE 125 ML/HR: 600; 310; 30; 20 INJECTION, SOLUTION INTRAVENOUS at 06:46

## 2017-08-23 RX ADMIN — LIDOCAINE HYDROCHLORIDE,EPINEPHRINE BITARTRATE 3 ML: 15; .005 INJECTION, SOLUTION EPIDURAL; INFILTRATION; INTRACAUDAL; PERINEURAL at 15:42

## 2017-08-23 RX ADMIN — FENTANYL CITRATE 100 MCG: 50 INJECTION, SOLUTION INTRAMUSCULAR; INTRAVENOUS at 06:45

## 2017-08-23 RX ADMIN — SUFENTANIL CITRATE 12 ML/HR: 50 INJECTION EPIDURAL; INTRAVENOUS at 15:50

## 2017-08-23 NOTE — ANESTHESIA PROCEDURE NOTES
Labor Epidural    Patient location during procedure: OB  Start Time: 8/23/2017 3:38 PM  Stop Time: 8/23/2017 3:45 PM  Performed By  CRNA: REBA RESENDEZ  Preanesthetic Checklist  Completed: patient identified, surgical consent, pre-op evaluation, timeout performed, IV checked, risks and benefits discussed and monitors and equipment checked  Additional Notes  Epidural placed after Lido 1% local. NIKOLAY to saline times 1 attempt.  No paresthesia, heme, or CSF. Cath threaded easily to 7 cm.  No reax to test dose..Loading dose done w/o problem.  Prep:  Pt Position:sitting  Sterile Tech:cap, gloves, gown, mask and sterile barrier  Prep:povidone-iodine 7.5% surgical scrub and chlorhexidine gluconate and isopropyl alcohol  Monitoring:blood pressure monitoring and EKG  Epidural Block Procedure:  Approach:midline  Guidance:landmark technique  Location:L3-L4  Needle Type:Tuohy  Needle Gauge:18 and 17 G  Loss of Resistance Medium: saline  Loss of Resistance: 8cm  Cath Depth at skin:7 cm  Paresthesia: none  Aspiration:negative  Test Dose:negative  Number of Attempts: 1  Post Assessment:  Dressing:occlusive dressing applied and secured with tape  Pt Tolerance:patient tolerated the procedure well with no apparent complications  Complications:no

## 2017-08-23 NOTE — NURSING NOTE
Per Dr. Holt, patient may ambulate at bedside and use birth ball post AROM. RN may use intermittent fetal monitoring.

## 2017-08-23 NOTE — PLAN OF CARE
Problem: Patient Care Overview (Adult)  Goal: Plan of Care Review  Outcome: Ongoing (interventions implemented as appropriate)    08/23/17 1703 08/23/17 1722   Coping/Psychosocial Response Interventions   Plan Of Care Reviewed With patient;spouse;mother --    Patient Care Overview   Progress --  improving         Problem: Labor (Cervical Ripen, Induct, Augment) (Adult,Obstetrics,Pediatric)  Intervention: Prevent/Manage Maternal Infection    08/23/17 1703   Safety Interventions   Infection Prevention personal protective equipment utilized   Hygiene Care Assistance   Perineal Care absorbent pad changed       Intervention: Position/Reposition to Promote Fetal Well Being/Optimize Contraction Pattern    08/23/17 1305 08/23/17 1704   Maternal/Fetal Interventions   Activity In Labor using birthing ball --    Positioning   Positioning --  side lying, left       Intervention: Monitor/Manage Labor Dystocia    08/23/17 1634   Maternal/Fetal Interventions   Labor Interventions ultrasound adjusted;toco adjusted       Intervention: Assess for/Assist with Variations in Fetal Presentation    08/23/17 1634 08/23/17 1704   Maternal/Fetal Interventions   Labor Interventions ultrasound adjusted;toco adjusted --    Positioning   Positioning --  side lying, left       Intervention: Monitor/Manage Labor Pain    08/23/17 1212   Manage Acute Burn Pain   Pain Management Interventions medicated       Intervention: Provide Emotional Support and Encouragement    08/23/17 0730   Coping Strategies   Trust Relationship/Rapport care explained;choices provided;questions answered;questions encouraged         Goal: Signs and Symptoms of Listed Potential Problems Will be Absent or Manageable (Labor)  Outcome: Ongoing (interventions implemented as appropriate)    08/23/17 1722   Labor (Cervical Ripen, Induct, Augment)   Problems Assessed (Labor) all   Problems Present (Labor) pain

## 2017-08-23 NOTE — PLAN OF CARE
Problem: Patient Care Overview (Adult)  Goal: Plan of Care Review  Outcome: Ongoing (interventions implemented as appropriate)    08/23/17 0148   Coping/Psychosocial Response Interventions   Plan Of Care Reviewed With patient;spouse   Patient Care Overview   Progress no change   Outcome Evaluation   Outcome Summary/Follow up Plan Monitoring contractions, cervical changes, pain, and VSS       Goal: Adult Individualization and Mutuality  Outcome: Ongoing (interventions implemented as appropriate)  Goal: Discharge Needs Assessment  Outcome: Ongoing (interventions implemented as appropriate)    Problem: Labor (Cervical Ripen, Induct, Augment) (Adult,Obstetrics,Pediatric)  Goal: Signs and Symptoms of Listed Potential Problems Will be Absent or Manageable (Labor)  Outcome: Ongoing (interventions implemented as appropriate)

## 2017-08-23 NOTE — ANESTHESIA PREPROCEDURE EVALUATION
Anesthesia Evaluation     Patient summary reviewed and Nursing notes reviewed   NPO Solid Status: > 8 hours  NPO Liquid Status: < 2 hours     Airway   Mallampati: II  TM distance: >3 FB  Neck ROM: full  no difficulty expected  Dental      Pulmonary - negative pulmonary ROS and normal exam   Cardiovascular - negative cardio ROS and normal exam        Neuro/Psych- negative ROS  GI/Hepatic/Renal/Endo - negative ROS     Musculoskeletal (-) negative ROS    Abdominal    Substance History - negative use     OB/GYN    (+) Pregnant,         Other - negative ROS                                       Anesthesia Plan    ASA 2     epidural     intravenous induction   Anesthetic plan and risks discussed with patient and spouse/significant other.  Use of blood products discussed with patient and spouse/significant other .

## 2017-08-23 NOTE — H&P
25yo  at 41.1 weeks scheduled for IOL due to being past due date. Pt presented late last night with contractions. Pt was found to be 1cm dilated and made no further cervical change. Pitocin has been started for augmentation. Pt is painfully mike and is now 4cm dilated and 90% effaced. GBBS neg. PNC uncomplicated. Pt declines an epidural. NST reactive.

## 2017-08-24 PROBLEM — Z37.9 NORMAL LABOR: Status: RESOLVED | Noted: 2017-08-18 | Resolved: 2017-08-24

## 2017-08-24 PROCEDURE — 99024 POSTOP FOLLOW-UP VISIT: CPT | Performed by: OBSTETRICS & GYNECOLOGY

## 2017-08-24 PROCEDURE — 90715 TDAP VACCINE 7 YRS/> IM: CPT

## 2017-08-24 PROCEDURE — 25010000002 TDAP 5-2.5-18.5 LF-MCG/0.5 SUSPENSION

## 2017-08-24 PROCEDURE — 90471 IMMUNIZATION ADMIN: CPT

## 2017-08-24 RX ORDER — SODIUM CHLORIDE 0.9 % (FLUSH) 0.9 %
1-10 SYRINGE (ML) INJECTION AS NEEDED
Status: DISCONTINUED | OUTPATIENT
Start: 2017-08-24 | End: 2017-08-25 | Stop reason: HOSPADM

## 2017-08-24 RX ORDER — PRENATAL VIT/IRON FUM/FOLIC AC 27MG-0.8MG
1 TABLET ORAL DAILY
Status: DISCONTINUED | OUTPATIENT
Start: 2017-08-24 | End: 2017-08-25 | Stop reason: HOSPADM

## 2017-08-24 RX ORDER — IBUPROFEN 800 MG/1
800 TABLET ORAL EVERY 8 HOURS PRN
Status: DISCONTINUED | OUTPATIENT
Start: 2017-08-24 | End: 2017-08-25 | Stop reason: HOSPADM

## 2017-08-24 RX ORDER — DOCUSATE SODIUM 100 MG/1
100 CAPSULE, LIQUID FILLED ORAL 2 TIMES DAILY
Status: DISCONTINUED | OUTPATIENT
Start: 2017-08-24 | End: 2017-08-25 | Stop reason: HOSPADM

## 2017-08-24 RX ORDER — METHYLERGONOVINE MALEATE 0.2 MG/ML
200 INJECTION INTRAVENOUS ONCE AS NEEDED
Status: DISCONTINUED | OUTPATIENT
Start: 2017-08-24 | End: 2017-08-25 | Stop reason: HOSPADM

## 2017-08-24 RX ORDER — OXYTOCIN/RINGER'S LACTATE 20/1000 ML
2 PLASTIC BAG, INJECTION (ML) INTRAVENOUS CONTINUOUS
Status: DISCONTINUED | OUTPATIENT
Start: 2017-08-24 | End: 2017-08-25 | Stop reason: HOSPADM

## 2017-08-24 RX ORDER — MISOPROSTOL 100 UG/1
800 TABLET ORAL AS NEEDED
Status: DISCONTINUED | OUTPATIENT
Start: 2017-08-24 | End: 2017-08-25 | Stop reason: HOSPADM

## 2017-08-24 RX ORDER — CARBOPROST TROMETHAMINE 250 UG/ML
250 INJECTION, SOLUTION INTRAMUSCULAR AS NEEDED
Status: DISCONTINUED | OUTPATIENT
Start: 2017-08-24 | End: 2017-08-25 | Stop reason: HOSPADM

## 2017-08-24 RX ORDER — HYDROCODONE BITARTRATE AND ACETAMINOPHEN 5; 325 MG/1; MG/1
1 TABLET ORAL EVERY 4 HOURS PRN
Status: DISCONTINUED | OUTPATIENT
Start: 2017-08-24 | End: 2017-08-25 | Stop reason: HOSPADM

## 2017-08-24 RX ADMIN — HYDROCODONE BITARTRATE AND ACETAMINOPHEN 1 TABLET: 5; 325 TABLET ORAL at 18:31

## 2017-08-24 RX ADMIN — HYDROCODONE BITARTRATE AND ACETAMINOPHEN 1 TABLET: 5; 325 TABLET ORAL at 06:02

## 2017-08-24 RX ADMIN — HYDROCODONE BITARTRATE AND ACETAMINOPHEN 1 TABLET: 5; 325 TABLET ORAL at 01:25

## 2017-08-24 RX ADMIN — BENZOCAINE AND MENTHOL: 20; .5 SPRAY TOPICAL at 14:45

## 2017-08-24 RX ADMIN — TETANUS TOXOID, REDUCED DIPHTHERIA TOXOID AND ACELLULAR PERTUSSIS VACCINE, ADSORBED 0.5 ML: 5; 2.5; 8; 8; 2.5 SUSPENSION INTRAMUSCULAR at 08:28

## 2017-08-24 RX ADMIN — IBUPROFEN 800 MG: 800 TABLET ORAL at 12:04

## 2017-08-24 RX ADMIN — PRENATAL VIT W/ FE FUMARATE-FA TAB 27-0.8 MG 1 TABLET: 27-0.8 TAB at 08:28

## 2017-08-24 RX ADMIN — DOCUSATE SODIUM 100 MG: 100 CAPSULE, LIQUID FILLED ORAL at 18:31

## 2017-08-24 RX ADMIN — HYDROCODONE BITARTRATE AND ACETAMINOPHEN 1 TABLET: 5; 325 TABLET ORAL at 12:04

## 2017-08-24 RX ADMIN — DOCUSATE SODIUM 100 MG: 100 CAPSULE, LIQUID FILLED ORAL at 08:28

## 2017-08-24 RX ADMIN — IBUPROFEN 800 MG: 800 TABLET ORAL at 01:39

## 2017-08-24 RX ADMIN — HYDROCODONE BITARTRATE AND ACETAMINOPHEN 1 TABLET: 5; 325 TABLET ORAL at 22:47

## 2017-08-24 NOTE — PLAN OF CARE
Problem: Patient Care Overview (Adult)  Goal: Plan of Care Review  Outcome: Ongoing (interventions implemented as appropriate)    08/24/17 0016   Coping/Psychosocial Response Interventions   Plan Of Care Reviewed With patient;spouse   Patient Care Overview   Progress improving   Outcome Evaluation   Outcome Summary/Follow up Plan Monitoring VS, I/O's, fundal checks, pain control and breastfeeding techniques       Goal: Adult Individualization and Mutuality  Outcome: Ongoing (interventions implemented as appropriate)  Goal: Discharge Needs Assessment  Outcome: Ongoing (interventions implemented as appropriate)    Problem: Postpartum, Vaginal Delivery (Adult)  Goal: Signs and Symptoms of Listed Potential Problems Will be Absent or Manageable (Postpartum, Vaginal Delivery)  Outcome: Ongoing (interventions implemented as appropriate)

## 2017-08-24 NOTE — PROGRESS NOTES
"[facility]    Progress Note       Patient Name: Lisa Walker  :  1990  MRN:  [id number]    Subjective     Subjective  Postpartum Day 1:     The patient feels well.  Her pain is well controlled with nonsteroidal anti-inflammatory drugs.   She is ambulating well.  Patient describes her bleeding as moderate lochia.    Breastfeeding: with difficulty. Baby is struggling to latch.    Objective      BP (!) 83/43 (BP Location: Left arm, Patient Position: Lying)  Pulse 72  Temp 98.1 °F (36.7 °C) (Oral)   Resp 18  Ht 62\" (157.5 cm)  Wt 170 lb (77.1 kg)  LMP 2016  SpO2 95%  Breastfeeding? Yes  BMI 31.09 kg/m2      Physical Exam:  General:  no acute distresss.  Abdomen: soft, NT, fundus firm and below umbilicus  Extremities: no calf tenderness      Lab results reviewed:  Yes.   Results from last 7 days  Lab Units 17  0109   HEMOGLOBIN g/dL 12.5         Assessment/Plan     1) PPD#0/1: Progressing well. Hgb:12.5. Repeat Hgb pending.    2) Postpartum Care: Breastfeeding but with difficulty.    3) Dispo: Plan for discharge later tomorrow or on Saturday.          Carlita Holt,   2017  4:50 PM    "

## 2017-08-24 NOTE — ANESTHESIA POSTPROCEDURE EVALUATION
Patient: Lisa Walker    Procedure Summary     Date Anesthesia Start Anesthesia Stop Room / Location    08/23/17 [id number]        Procedure Diagnosis Scheduled Providers Provider    LABOR ANALGESIA No diagnosis on file.  Dolores Atkinson CRNA          Anesthesia Type: epidural  Last vitals  BP        Temp        Pulse       Resp        SpO2          Post Anesthesia Care and Evaluation    Patient location during evaluation: bedside  Patient participation: complete - patient participated  Level of consciousness: awake and alert  Pain management: adequate  Airway patency: patent  Anesthetic complications: No anesthetic complications  PONV Status: none  Cardiovascular status: acceptable  Respiratory status: acceptable  Hydration status: acceptable

## 2017-08-24 NOTE — NURSING NOTE
Call out from patient room by Dr. Holt to come help with breastfeeding.  Dr. Holt concern that infant had not fed and mom having trouble breastfeeding.  RN went to room and assisted patient with breastfeeding education, manual and electric pumping.  Manual and electric pump to bedside.  Patient assisted in using manual breat pump and assisted in setting up electric breast pump.  Patient currently using electric breast pump. Dr. Alegria to be called per Dr. Holt and Dr. Hardy about concern that infant has not really eaten since early morning hours after delivery.

## 2017-08-24 NOTE — PROGRESS NOTES
Patient: Lisa Walker  * No surgery found *  Anesthesia type: [unfilled]    Patient location: Labor and Delivery  Last vitals:   Vitals:    08/24/17 0832   BP: (!) 83/43   Pulse: 72   Resp: 18   Temp: 98.1 °F (36.7 °C)   SpO2: 95%     Level of consciousness: awake, alert and oriented    Post-anesthesia pain: adequate analgesia  Airway patency: patent  Respiratory: unassisted, room air  Cardiovascular: stable and documented B/P above noted . pt sitting up talking . stable/ no c/o  Hydration: euvolemic    Anesthetic complications: no

## 2017-08-25 ENCOUNTER — TELEPHONE (OUTPATIENT)
Dept: OBSTETRICS AND GYNECOLOGY | Facility: CLINIC | Age: 27
End: 2017-08-25

## 2017-08-25 VITALS
HEIGHT: 62 IN | BODY MASS INDEX: 31.28 KG/M2 | SYSTOLIC BLOOD PRESSURE: 114 MMHG | HEART RATE: 74 BPM | OXYGEN SATURATION: 97 % | DIASTOLIC BLOOD PRESSURE: 62 MMHG | TEMPERATURE: 97.8 F | WEIGHT: 170 LBS | RESPIRATION RATE: 20 BRPM

## 2017-08-25 LAB
HCT VFR BLD AUTO: 30.5 % (ref 37–47)
HGB BLD-MCNC: 10.2 G/DL (ref 12–16)

## 2017-08-25 PROCEDURE — 85014 HEMATOCRIT: CPT | Performed by: OBSTETRICS & GYNECOLOGY

## 2017-08-25 PROCEDURE — 85018 HEMOGLOBIN: CPT | Performed by: OBSTETRICS & GYNECOLOGY

## 2017-08-25 PROCEDURE — 99024 POSTOP FOLLOW-UP VISIT: CPT | Performed by: OBSTETRICS & GYNECOLOGY

## 2017-08-25 RX ORDER — IBUPROFEN 800 MG/1
800 TABLET ORAL EVERY 8 HOURS PRN
Qty: 30 TABLET | Refills: 0 | Status: SHIPPED | OUTPATIENT
Start: 2017-08-25 | End: 2018-06-13

## 2017-08-25 RX ORDER — HYDROCODONE BITARTRATE AND ACETAMINOPHEN 5; 325 MG/1; MG/1
1 TABLET ORAL EVERY 4 HOURS PRN
Qty: 15 TABLET | Refills: 0 | Status: SHIPPED | OUTPATIENT
Start: 2017-08-25 | End: 2017-09-03

## 2017-08-25 RX ORDER — PSEUDOEPHEDRINE HCL 30 MG
100 TABLET ORAL 2 TIMES DAILY PRN
Qty: 30 CAPSULE | Refills: 0 | Status: SHIPPED | OUTPATIENT
Start: 2017-08-25 | End: 2018-06-13

## 2017-08-25 RX ADMIN — PRENATAL VIT W/ FE FUMARATE-FA TAB 27-0.8 MG 1 TABLET: 27-0.8 TAB at 08:54

## 2017-08-25 RX ADMIN — HYDROCODONE BITARTRATE AND ACETAMINOPHEN 1 TABLET: 5; 325 TABLET ORAL at 04:23

## 2017-08-25 RX ADMIN — IBUPROFEN 800 MG: 800 TABLET ORAL at 08:54

## 2017-08-25 RX ADMIN — DOCUSATE SODIUM 100 MG: 100 CAPSULE, LIQUID FILLED ORAL at 08:54

## 2017-08-25 NOTE — DISCHARGE SUMMARY
"Obstetrical Discharge Form    Primary OB Clinician: [facility]      Gestational Age: 41.1 weeks    Antepartum complications: none    Date of Delivery:  2017     Delivered By: Carlita Holt     Delivery Type: spontaneous vaginal delivery    Tubal Ligation: n/a    Baby: Liveborn male    Anesthesia: epidural    Intrapartum complications: None    Laceration: 2nd degree      Feeding method: breast      Discharge Date: 2017; Discharge Time: 10:53 AM    /59 (BP Location: Left arm, Patient Position: Lying)  Pulse 67  Temp 97.9 °F (36.6 °C) (Oral)   Resp 20  Ht 62\" (157.5 cm)  Wt 170 lb (77.1 kg)  LMP 2016  SpO2 99%  Breastfeeding? Yes  BMI 31.09 kg/m2     Abd: soft, NT. Fundus firm.  Ext: No calf tenderness    Results from last 7 days  Lab Units 17  0529   HEMOGLOBIN g/dL 10.2*             Plan:    27yo  s/p     1) PPD#1: Progressing well.    2) PP Care: Breastfeeding and supplementing with bottle. Male infant. Declines circumcision. Plans on Mirena IUD PP.        Patient given written instruction sheet.  Follow-up appointment with Dr Holt in 6 weeks.    Carlita Holt DO  10:53 AM  2017  "

## 2017-08-27 ENCOUNTER — DOCUMENTATION (OUTPATIENT)
Dept: OBSTETRICS AND GYNECOLOGY | Facility: CLINIC | Age: 27
End: 2017-08-27

## 2017-08-28 NOTE — NURSING NOTE
Case Management Discharge Note    Final Note: dc home to self care    Discharge Placement     No information found             Discharge Codes: 01  Discharge to home

## 2017-08-28 NOTE — L&D DELIVERY NOTE
MONISHA Simpson  Vaginal Delivery Note    Delivery     Delivery: Vaginal, Spontaneous Delivery     YOB: 2017    Time of Birth: 10:56 PM      Anesthesia: Epidural     Delivering clinician: Carlita Romeo    Forceps?   No   Vacuum? Yes  Vacuum Delivery  Vacuum attempted? No     Vacuum indication:      Vacuum type: Kiwi    Application location:      First Attempt     Time applied: 10:55 PM    Time removed: 11:56 PM    Second Attempt    Time applied:      Time removed:      Third Attempt    Time applied:      Time removed:      Number of pulls: 1    Number of pop-offs:      Low-end pressure range:      High-end pressure range:      Total application time:      Applied by: CARLITA ROMEO    Failed? No          Shoulder dystocia present: No        Delivery narrative:  27yo  at 41.1 weeks was planned for IOL due to past due date. PNC has been uncomplicated and GBBS negative. Pt presented the night prior to planned IOL complaining of contractions and found to be irregularly mike and 1cm dilated. Pt made no further cervical change throughout the night and therefore, Pitocin was started. Pt declined an epidural and continued to make cervical change until she was 4cm dilated. After 3 hours of no cervical change, AROM was performed with clear fluid noted. Pt decided to get an epidural at 5cm dilated. Cervical change continued to take place until patient was completely dilated and ready to begin pushing. Each time patient pushed fetal bradycardia down to the 50-60's would occur with slow return to baseline. In order for baby to have time to recover, the pt was asked to push with every other contraction. The fetal bradycardia was taking longer to return to baseline and the decision was made to apply a vacuum to the fetal head to aid in delivery. The risks of fetal scalp hematoma was reviewed with the pt and FOB. The vacuum was applied to the fetal head at +1/2 station and with the next contraction the  vacuum was engaged to the green area and pulled while the pt pushed. A pop off occurred on the first pull but pt was able to deliver the fetal head on the following push without assistance. A tight nuchal cord was diagnosed and delivered with the anterior shoulder and the rest of the body. The baby was placed on mom's chest for Kangaroo care and the cord was clamped and cut. Cord blood was collected and the placenta was delivered. A second degree vaginal laceration was noted and repaired with 3-0 Vicryl in a running and locked fashion. The uterus was firm after delivery with EBL 300cc. Mom and baby both stable after delivery and routine PP orders started.    Infant    Findings: male  infant     Infant observations: Weight: No birth weight on file.   Length:    in  Observations/Comments:         Apgars: 8   @ 1 minute /    9   @ 5 minutes   Infant Name: [place]     Placenta, Cord, and Fluid    Placenta delivered  Spontaneous  at   8/23 10:59 PM     Cord:    present.   Nuchal Cord?  yes; Number of nuchal loops present:         Cord blood obtained: Yes    Cord gases obtained:  No    Cord gas results: Venous:  No results found for: PHCVEN    Arterial:  No results found for: PHCART     Repair    Episiotomy: None    Lacerations: Yes  Laceration Information  Laceration Repaired?   Perineal:         Periurethral:         Labial:         Sulcus:         Vaginal: Yes  Yes 2nd degree   Cervical:              Estimated Blood Loss: 300cc   Suture used for repair: 3-0 Vicryl      Complications  Fetal bradycardia    Disposition  Mother to Mother Baby/Postpartum  in stable condition currently.  Baby to remains with mom  in stable condition currently.      Carlita Holt DO  08/28/17  5:14 PM

## 2017-09-28 ENCOUNTER — POSTPARTUM VISIT (OUTPATIENT)
Dept: OBSTETRICS AND GYNECOLOGY | Facility: CLINIC | Age: 27
End: 2017-09-28

## 2017-09-28 VITALS
BODY MASS INDEX: 27.97 KG/M2 | SYSTOLIC BLOOD PRESSURE: 118 MMHG | WEIGHT: 152 LBS | DIASTOLIC BLOOD PRESSURE: 82 MMHG | HEIGHT: 62 IN

## 2017-09-28 DIAGNOSIS — Z13.9 SCREENING: ICD-10-CM

## 2017-09-28 LAB
BILIRUB BLD-MCNC: NEGATIVE MG/DL
CLARITY, POC: CLEAR
COLOR UR: YELLOW
GLUCOSE UR STRIP-MCNC: NEGATIVE MG/DL
KETONES UR QL: NEGATIVE
LEUKOCYTE EST, POC: NEGATIVE
NITRITE UR-MCNC: NEGATIVE MG/ML
PH UR: 5 [PH] (ref 5–8)
PROT UR STRIP-MCNC: NEGATIVE MG/DL
RBC # UR STRIP: ABNORMAL /UL
SP GR UR: 1 (ref 1–1.03)
UROBILINOGEN UR QL: NORMAL

## 2017-09-28 PROCEDURE — 81002 URINALYSIS NONAUTO W/O SCOPE: CPT | Performed by: OBSTETRICS & GYNECOLOGY

## 2017-09-28 PROCEDURE — 0503F POSTPARTUM CARE VISIT: CPT | Performed by: OBSTETRICS & GYNECOLOGY

## 2017-09-28 NOTE — PROGRESS NOTES
"Chief Complaint   Patient presents with   • Postpartum Care     breast feeding/doing well        HPI      Date of delivery: 6 weeks ago status post vacuum-assisted delivery    The patient feels well. Patient describes her bleeding as staining only.  She declines baby blues.  She wants a Mirena.    Breastfeeding: without difficulty.    Review of Systems   Constitutional: Negative for appetite change, fever and unexpected weight change.   Respiratory: Negative for cough and shortness of breath.    Cardiovascular: Negative for chest pain and palpitations.   Gastrointestinal: Negative for abdominal distention, abdominal pain, constipation, diarrhea, nausea and vomiting.   Endocrine: Negative.    Genitourinary: Positive for vaginal bleeding (still spotting slightly). Negative for dyspareunia, menstrual problem, pelvic pain and vaginal discharge.   Skin: Negative.    Hematological: Negative.    Psychiatric/Behavioral: Negative for dysphoric mood and sleep disturbance. The patient is not nervous/anxious.        The following portions of the patient's history were reviewed and updated as appropriate: allergies, current medications and problem list.      Objective      /82  Ht 62\" (157.5 cm)  Wt 152 lb (68.9 kg)  LMP 11/17/2016  BMI 27.8 kg/m2      Physical Exam   Constitutional: She is oriented to person, place, and time. She appears well-developed and well-nourished.   HENT:   Head: Normocephalic and atraumatic.   Pulmonary/Chest: Effort normal.   Abdominal: Soft. Bowel sounds are normal. She exhibits no distension and no mass. There is no tenderness. There is no rebound and no guarding.   Musculoskeletal: Normal range of motion.   Neurological: She is alert and oriented to person, place, and time.   Skin: Skin is warm and dry.   Psychiatric: She has a normal mood and affect. Her behavior is normal. Judgment and thought content normal.   Nursing note and vitals reviewed.              Assessment/Plan     1) PPD#6 " weeks: Progressing well.     2) Postpartum Care: Will order Mirena    3) Gyn HM: pap UTD    4) Contraception: IUD    5) Return in about 1 year (around 9/28/2018) for Annual physical.        Ethan Myers MD  9/28/2017  1:46 PM

## 2017-10-31 ENCOUNTER — PROCEDURE VISIT (OUTPATIENT)
Dept: OBSTETRICS AND GYNECOLOGY | Facility: CLINIC | Age: 27
End: 2017-10-31

## 2017-10-31 VITALS
BODY MASS INDEX: 28.52 KG/M2 | HEIGHT: 62 IN | DIASTOLIC BLOOD PRESSURE: 80 MMHG | WEIGHT: 155 LBS | SYSTOLIC BLOOD PRESSURE: 116 MMHG

## 2017-10-31 DIAGNOSIS — Z12.4 PAP SMEAR FOR CERVICAL CANCER SCREENING: ICD-10-CM

## 2017-10-31 DIAGNOSIS — Z30.430 ENCOUNTER FOR IUD INSERTION: Primary | ICD-10-CM

## 2017-10-31 DIAGNOSIS — Z13.9 SCREENING FOR CONDITION: ICD-10-CM

## 2017-10-31 PROCEDURE — 99212 OFFICE O/P EST SF 10 MIN: CPT | Performed by: OBSTETRICS & GYNECOLOGY

## 2017-10-31 PROCEDURE — 81002 URINALYSIS NONAUTO W/O SCOPE: CPT | Performed by: OBSTETRICS & GYNECOLOGY

## 2017-10-31 PROCEDURE — 58300 INSERT INTRAUTERINE DEVICE: CPT | Performed by: OBSTETRICS & GYNECOLOGY

## 2017-10-31 PROCEDURE — 81025 URINE PREGNANCY TEST: CPT | Performed by: OBSTETRICS & GYNECOLOGY

## 2017-11-01 LAB

## 2017-11-01 NOTE — PROGRESS NOTES
Procedure: Intrauterine device insertion    Chief Complaint: IUD insertion    Pre procedure indication 1) Desires Mirena  Post procedure indication 1) Desires Mirena    The risks, benefits, and alternatives to IUD were explained at length with the patient. All her questions were answered and consents were signed.    The patient was placed in a dorsal lithotomy position on the examining table in [facility]. A bimanual exam confirmed the uterus was normal in size, anteverted. A warmed metal speculum was inserted into the vagina and the cervix was brought into view.    The cervix was prepped with Betadine. The anterior lip was grasped with a single-tooth tenaculum. The endometrial cavity was then sounded to 8 cm without use of a dilator. The IUD was removed in a sterile fashion.    The  was then carefully advanced to the cervical canal into the uterus to the level of the fundus. This was then backed off about 1.5-2 cm to allow sufficient space for the arms to open. The device was deployed. The  was removed carefully from the uterus. The threads were then cut leaving 2-3 cm visible outside of the cervix.  The single-tooth tenaculum was removed from the anterior lip. Good hemostasis was noted.     All other instruments were removed from the vagina.   There were no complications.  The patient tolerated the procedure well with a minimal amount of discomfort.    The patient was counseled about the need to return in 4 weeks for string check.     She was counseled about the need to use a backup method of contraception such as condoms for 1-2 weeks. The patient is counseled to contact us if she has any significant or increasing bleeding, pain, fever, chills, or other concerns. She is instructed to see a doctor right away if she believes that she may be pregnant at any time with the IUD in place.    Carlita Holt,     11/1/2017  11:42 AM

## 2017-11-01 NOTE — PROGRESS NOTES
"PROBLEM VISIT    Chief Complaint: pt presents for IUD insertion and pap smear      Lisa Walker is a 27 y.o. patient  presents for IUD insertion. LPS 2017. Pt is breastfeeding without difficulty- has no breast complaints. No PP depression. Has attempted intercourse with a condom but reports it was painful.  Chief Complaint   Patient presents with   • Contraception     iud insertion, mirena             The following portions of the patient's history were reviewed and updated as appropriate: allergies, current medications and problem list.    Review of Systems   Genitourinary: Positive for dyspareunia. Negative for menstrual problem, pelvic pain, vaginal bleeding and vaginal discharge.       /80  Ht 62\" (157.5 cm)  Wt 155 lb (70.3 kg)  LMP 2016  BMI 28.35 kg/m2    Physical Exam   Constitutional: She is oriented to person, place, and time. She appears well-developed and well-nourished. No distress.   Genitourinary: There is no rash, tenderness, lesion or injury on the right labia. There is no rash, tenderness, lesion or injury on the left labia. Cervix exhibits no motion tenderness, no discharge and no friability. No erythema, tenderness or bleeding in the vagina. No foreign body in the vagina. No signs of injury around the vagina. No vaginal discharge found.   Neurological: She is alert and oriented to person, place, and time.   Skin: She is not diaphoretic.   Psychiatric: She has a normal mood and affect. Her behavior is normal. Judgment and thought content normal.   Vitals reviewed.        Assessment/Plan   Lisa was seen today for contraception.    Diagnoses and all orders for this visit:    Encounter for IUD insertion  -     levonorgestrel (MIRENA) 20 MCG/24HR IUD; by Intrauterine route 1 (One) Time.    Screening for condition  -     POC Urinalysis Dipstick  -     POC Pregnancy, Urine    Pap smear for cervical cancer screening  -     Pap IG, Rfx HPV ASCU - ThinPrep Vial, Cervix      26yo "  s/p VAVD for pap smear and Mirena IUD insertion    1) PPD# 17. breastfeeding    2) Gyn HM: Check pap smear    3) Contraception: mirena IUD insertion. See procedure note.    4) FU 4 weeks for string check           No Follow-up on file.      Carlita Holt DO    2017  11:40 AM

## 2017-11-03 LAB
CONV .: NORMAL
CYTOLOGIST CVX/VAG CYTO: NORMAL
CYTOLOGY CVX/VAG DOC THIN PREP: NORMAL
DX ICD CODE: NORMAL
DX ICD CODE: NORMAL
HIV 1 & 2 AB SER-IMP: NORMAL
Lab: NORMAL
OTHER STN SPEC: NORMAL
PATH REPORT.FINAL DX SPEC: NORMAL
STAT OF ADQ CVX/VAG CYTO-IMP: NORMAL

## 2017-11-06 ENCOUNTER — TELEPHONE (OUTPATIENT)
Dept: OBSTETRICS AND GYNECOLOGY | Facility: CLINIC | Age: 27
End: 2017-11-06

## 2017-11-06 RX ORDER — METRONIDAZOLE 7.5 MG/G
GEL VAGINAL NIGHTLY
Qty: 70 G | Refills: 0 | Status: SHIPPED | OUTPATIENT
Start: 2017-11-06 | End: 2017-11-11

## 2017-11-06 NOTE — TELEPHONE ENCOUNTER
----- Message from Carlita Holt DO sent at 11/6/2017  2:34 PM EST -----  Pap is normal Has BV- will send metrogel per vagina for 5 days since breastfeeding

## 2018-04-26 ENCOUNTER — OFFICE VISIT (OUTPATIENT)
Dept: FAMILY MEDICINE CLINIC | Facility: CLINIC | Age: 28
End: 2018-04-26

## 2018-04-26 VITALS
WEIGHT: 144.9 LBS | BODY MASS INDEX: 24.74 KG/M2 | HEIGHT: 64 IN | OXYGEN SATURATION: 97 % | HEART RATE: 100 BPM | SYSTOLIC BLOOD PRESSURE: 122 MMHG | DIASTOLIC BLOOD PRESSURE: 60 MMHG | TEMPERATURE: 97.6 F

## 2018-04-26 DIAGNOSIS — R41.840 ATTENTION OR CONCENTRATION DEFICIT: Primary | ICD-10-CM

## 2018-04-26 PROCEDURE — 99213 OFFICE O/P EST LOW 20 MIN: CPT | Performed by: FAMILY MEDICINE

## 2018-04-26 RX ORDER — DEXTROAMPHETAMINE SACCHARATE, AMPHETAMINE ASPARTATE, DEXTROAMPHETAMINE SULFATE AND AMPHETAMINE SULFATE 3.75; 3.75; 3.75; 3.75 MG/1; MG/1; MG/1; MG/1
15 TABLET ORAL 2 TIMES DAILY
Qty: 60 TABLET | Refills: 0 | Status: SHIPPED | OUTPATIENT
Start: 2018-04-26 | End: 2018-05-30 | Stop reason: SDUPTHER

## 2018-04-26 NOTE — PROGRESS NOTES
Subjective   Lisa Walker is a 27 y.o. female presenting with   Chief Complaint   Patient presents with   • Med Refill     would like to go back on  adderall        27-year-old  white female nonsmoker comes in today for routine follow-up for ADD.  She had a normal spontaneous vaginal delivery of a male infant name Tanesha, and is not breast-feeding right now.  She used to be on Adderall at a total of 30 mg daily.  She would like to go back on that.  She does have an IUD so unintended pregnancy is very unlikely.         The following portions of the patient's history were reviewed and updated as appropriate: current medications, past family history, past medical history, past social history, past surgical history and problem list.    Review of Systems   Psychiatric/Behavioral: Positive for decreased concentration.   All other systems reviewed and are negative.      Objective   Physical Exam   Constitutional: She is oriented to person, place, and time. She appears well-developed and well-nourished. No distress.   HENT:   Head: Normocephalic.   Eyes: EOM are normal. Pupils are equal, round, and reactive to light.   Neck: Normal range of motion. Neck supple.   Cardiovascular: Normal rate, regular rhythm, normal heart sounds and intact distal pulses.  Exam reveals no friction rub.    No murmur heard.  Pulmonary/Chest: Effort normal and breath sounds normal. No respiratory distress. She has no wheezes.   Musculoskeletal: Normal range of motion. She exhibits no edema or tenderness.   Neurological: She is alert and oriented to person, place, and time.   Skin: Skin is warm and dry. She is not diaphoretic.   Psychiatric: She has a normal mood and affect. Her behavior is normal.   Nursing note and vitals reviewed.      Assessment/Plan   Lisa was seen today for med refill.    Diagnoses and all orders for this visit:    Attention or concentration deficit    Other orders  -     amphetamine-dextroamphetamine (ADDERALL)  15 MG tablet; Take 1 tablet by mouth 2 (Two) Times a Day.                   I would like him to return for another visit in 3 month(s)

## 2018-04-26 NOTE — PATIENT INSTRUCTIONS
This is a very nice 27-year-old who is here for routine follow-up for ADD.  I have given her prescription, but would like her to call if there is any problem.

## 2018-05-30 ENCOUNTER — OFFICE VISIT (OUTPATIENT)
Dept: FAMILY MEDICINE CLINIC | Facility: CLINIC | Age: 28
End: 2018-05-30

## 2018-05-30 VITALS
TEMPERATURE: 97.5 F | WEIGHT: 142 LBS | OXYGEN SATURATION: 97 % | HEIGHT: 64 IN | SYSTOLIC BLOOD PRESSURE: 108 MMHG | HEART RATE: 81 BPM | DIASTOLIC BLOOD PRESSURE: 68 MMHG | BODY MASS INDEX: 24.24 KG/M2

## 2018-05-30 DIAGNOSIS — R53.82 CHRONIC FATIGUE: ICD-10-CM

## 2018-05-30 DIAGNOSIS — R41.840 ATTENTION OR CONCENTRATION DEFICIT: Primary | ICD-10-CM

## 2018-05-30 PROCEDURE — 99213 OFFICE O/P EST LOW 20 MIN: CPT | Performed by: FAMILY MEDICINE

## 2018-05-30 RX ORDER — DEXTROAMPHETAMINE SACCHARATE, AMPHETAMINE ASPARTATE, DEXTROAMPHETAMINE SULFATE AND AMPHETAMINE SULFATE 3.75; 3.75; 3.75; 3.75 MG/1; MG/1; MG/1; MG/1
15 TABLET ORAL 2 TIMES DAILY
Qty: 60 TABLET | Refills: 0 | Status: SHIPPED | OUTPATIENT
Start: 2018-05-30 | End: 2018-07-06 | Stop reason: SDUPTHER

## 2018-05-30 NOTE — PROGRESS NOTES
Subjective   Lisa Walker is a 27 y.o. female presenting with   Chief Complaint   Patient presents with   • Thyroid Problem     possible thyroid issues        27-year-old  white female nonsmoker comes in today for routine follow-up for ADD.  The medication continues to work quite well and she does not have any side effects.  Her baby is doing quite well and she tells me she is not currently breast-feeding.    She does say that lately she has noticed her hair is falling out and she has no energy and she is having trouble losing weight.  Her mother has hypothyroidism and suggested that she get checked for that as well.      Thyroid Problem   Symptoms include fatigue.        The following portions of the patient's history were reviewed and updated as appropriate: current medications, past family history, past medical history, past social history, past surgical history and problem list.    Review of Systems   Constitutional: Positive for fatigue.   All other systems reviewed and are negative.      Objective   Physical Exam   Constitutional: She is oriented to person, place, and time. She appears well-developed and well-nourished. No distress.   HENT:   Head: Normocephalic and atraumatic.   Eyes: EOM are normal. Pupils are equal, round, and reactive to light. No scleral icterus.   Neck: Normal range of motion. Neck supple. No JVD present. No thyromegaly present.   Cardiovascular: Normal rate, regular rhythm, normal heart sounds and intact distal pulses.  Exam reveals no friction rub.    No murmur heard.  Pulmonary/Chest: Effort normal and breath sounds normal. No respiratory distress. She has no wheezes.   Musculoskeletal: Normal range of motion. She exhibits no edema.   Lymphadenopathy:     She has no cervical adenopathy.   Neurological: She is alert and oriented to person, place, and time.   Skin: Skin is warm and dry. She is not diaphoretic.   Psychiatric: She has a normal mood and affect. Her behavior is  normal.   Nursing note and vitals reviewed.      Assessment/Plan   Lisa was seen today for thyroid problem.    Diagnoses and all orders for this visit:    Attention or concentration deficit    Chronic fatigue  -     Comprehensive Metabolic Panel  -     TSH  -     CBC & Differential  -     T4, Free  -     Thyroid Antibodies    Other orders  -     amphetamine-dextroamphetamine (ADDERALL) 15 MG tablet; Take 1 tablet by mouth 2 (Two) Times a Day.                   I would like him to return for another visit in 3 month(s)

## 2018-05-30 NOTE — PATIENT INSTRUCTIONS
This is a very nice 27-year-old who has a family history of hypothyroidism and has been fatigued with hair loss lately.  I will request blood work and notify her when the results are available.

## 2018-05-31 LAB
ALBUMIN SERPL-MCNC: 4.2 G/DL (ref 3.5–5.2)
ALBUMIN/GLOB SERPL: 1.6 G/DL
ALP SERPL-CCNC: 85 U/L (ref 39–117)
ALT SERPL-CCNC: 19 U/L (ref 1–33)
AST SERPL-CCNC: 16 U/L (ref 1–32)
BASOPHILS # BLD AUTO: 0.01 10*3/MM3 (ref 0–0.2)
BASOPHILS NFR BLD AUTO: 0.2 % (ref 0–1.5)
BILIRUB SERPL-MCNC: 0.2 MG/DL (ref 0.1–1.2)
BUN SERPL-MCNC: 18 MG/DL (ref 6–20)
BUN/CREAT SERPL: 34.6 (ref 7–25)
CALCIUM SERPL-MCNC: 9 MG/DL (ref 8.6–10.5)
CHLORIDE SERPL-SCNC: 104 MMOL/L (ref 98–107)
CO2 SERPL-SCNC: 24.6 MMOL/L (ref 22–29)
CREAT SERPL-MCNC: 0.52 MG/DL (ref 0.57–1)
EOSINOPHIL # BLD AUTO: 0.08 10*3/MM3 (ref 0–0.7)
EOSINOPHIL NFR BLD AUTO: 1.3 % (ref 0.3–6.2)
ERYTHROCYTE [DISTWIDTH] IN BLOOD BY AUTOMATED COUNT: 13.2 % (ref 11.7–13)
GFR SERPLBLD CREATININE-BSD FMLA CKD-EPI: 141 ML/MIN/1.73
GFR SERPLBLD CREATININE-BSD FMLA CKD-EPI: >150 ML/MIN/1.73
GLOBULIN SER CALC-MCNC: 2.6 GM/DL
GLUCOSE SERPL-MCNC: 80 MG/DL (ref 65–99)
HCT VFR BLD AUTO: 40.6 % (ref 35.6–45.5)
HGB BLD-MCNC: 13.3 G/DL (ref 11.9–15.5)
IMM GRANULOCYTES # BLD: 0.01 10*3/MM3 (ref 0–0.03)
IMM GRANULOCYTES NFR BLD: 0.2 % (ref 0–0.5)
LYMPHOCYTES # BLD AUTO: 2.29 10*3/MM3 (ref 0.9–4.8)
LYMPHOCYTES NFR BLD AUTO: 37.9 % (ref 19.6–45.3)
MCH RBC QN AUTO: 31.4 PG (ref 26.9–32)
MCHC RBC AUTO-ENTMCNC: 32.8 G/DL (ref 32.4–36.3)
MCV RBC AUTO: 95.8 FL (ref 80.5–98.2)
MONOCYTES # BLD AUTO: 0.73 10*3/MM3 (ref 0.2–1.2)
MONOCYTES NFR BLD AUTO: 12.1 % (ref 5–12)
NEUTROPHILS # BLD AUTO: 2.93 10*3/MM3 (ref 1.9–8.1)
NEUTROPHILS NFR BLD AUTO: 48.5 % (ref 42.7–76)
PLATELET # BLD AUTO: 293 10*3/MM3 (ref 140–500)
POTASSIUM SERPL-SCNC: 4.5 MMOL/L (ref 3.5–5.2)
PROT SERPL-MCNC: 6.8 G/DL (ref 6–8.5)
RBC # BLD AUTO: 4.24 10*6/MM3 (ref 3.9–5.2)
SODIUM SERPL-SCNC: 142 MMOL/L (ref 136–145)
T4 FREE SERPL-MCNC: 1.1 NG/DL (ref 0.93–1.7)
THYROGLOB AB SERPL-ACNC: <1 IU/ML (ref 0–0.9)
THYROPEROXIDASE AB SERPL-ACNC: 19 IU/ML (ref 0–34)
TSH SERPL DL<=0.005 MIU/L-ACNC: 1.43 MIU/ML (ref 0.27–4.2)
WBC # BLD AUTO: 6.04 10*3/MM3 (ref 4.5–10.7)

## 2018-07-06 ENCOUNTER — OFFICE VISIT (OUTPATIENT)
Dept: FAMILY MEDICINE CLINIC | Facility: CLINIC | Age: 28
End: 2018-07-06

## 2018-07-06 VITALS
DIASTOLIC BLOOD PRESSURE: 60 MMHG | HEART RATE: 85 BPM | SYSTOLIC BLOOD PRESSURE: 102 MMHG | TEMPERATURE: 97.8 F | HEIGHT: 62 IN | OXYGEN SATURATION: 97 % | BODY MASS INDEX: 25.58 KG/M2 | WEIGHT: 139 LBS

## 2018-07-06 DIAGNOSIS — R41.840 ATTENTION OR CONCENTRATION DEFICIT: Primary | ICD-10-CM

## 2018-07-06 PROCEDURE — 99213 OFFICE O/P EST LOW 20 MIN: CPT | Performed by: FAMILY MEDICINE

## 2018-07-06 RX ORDER — DEXTROAMPHETAMINE SACCHARATE, AMPHETAMINE ASPARTATE, DEXTROAMPHETAMINE SULFATE AND AMPHETAMINE SULFATE 3.75; 3.75; 3.75; 3.75 MG/1; MG/1; MG/1; MG/1
15 TABLET ORAL 2 TIMES DAILY
Qty: 60 TABLET | Refills: 0 | Status: SHIPPED | OUTPATIENT
Start: 2018-07-06 | End: 2018-08-06 | Stop reason: SDUPTHER

## 2018-07-06 NOTE — PROGRESS NOTES
Subjective   Lisa Walker is a 27 y.o. female presenting with   Chief Complaint   Patient presents with   • Follow-up     3 month medication follow up/adderall        27-year-old single white female nonsmoker who is getting  this weekend in [place], comes in today for routine follow-up for ADD.  She says the medication continues to work quite well without any side effects.    She was holding her baby 1 AB came by and she reflexively jerked away and hit her head on a post and sustained a small laceration above her right eyebrow.  She went to urgent care but told that she could not have sutures since she was getting  so she had Dermabond.  It appears to be healing quite well without any problems.         The following portions of the patient's history were reviewed and updated as appropriate: current medications, past family history, past medical history, past social history, past surgical history and problem list.    Review of Systems   All other systems reviewed and are negative.      Objective   Physical Exam   Constitutional: She is oriented to person, place, and time. She appears well-developed and well-nourished.   HENT:   Head: Normocephalic.       Eyes: EOM are normal. Pupils are equal, round, and reactive to light.   Neck: Normal range of motion. Neck supple.   Cardiovascular: Normal rate and regular rhythm.    Pulmonary/Chest: Effort normal and breath sounds normal.   Musculoskeletal: Normal range of motion.   Neurological: She is alert and oriented to person, place, and time.   Skin: Skin is warm and dry.   Psychiatric: She has a normal mood and affect. Her behavior is normal.   Nursing note and vitals reviewed.      Assessment/Plan   Lisa was seen today for follow-up.    Diagnoses and all orders for this visit:    Attention or concentration deficit    Other orders  -     amphetamine-dextroamphetamine (ADDERALL) 15 MG tablet; Take 1 tablet by mouth 2 (Two) Times a Day.                    I would like him to return for another visit in 3 month(s)

## 2018-07-06 NOTE — PATIENT INSTRUCTIONS
This is a very nice 27-year-old who is here for routine follow-up for ADD.  The medication appears to continue to work without side effects so I have given her refill.  I would like her to call if there is a problem.

## 2018-08-06 ENCOUNTER — TELEPHONE (OUTPATIENT)
Dept: FAMILY MEDICINE CLINIC | Facility: CLINIC | Age: 28
End: 2018-08-06

## 2018-08-06 RX ORDER — DEXTROAMPHETAMINE SACCHARATE, AMPHETAMINE ASPARTATE, DEXTROAMPHETAMINE SULFATE AND AMPHETAMINE SULFATE 3.75; 3.75; 3.75; 3.75 MG/1; MG/1; MG/1; MG/1
15 TABLET ORAL 2 TIMES DAILY
Qty: 60 TABLET | Refills: 0 | Status: SHIPPED | OUTPATIENT
Start: 2018-08-06 | End: 2018-09-10 | Stop reason: SDUPTHER

## 2018-08-06 NOTE — TELEPHONE ENCOUNTER
Pt of angelina's  Needing    adderall refill    Last seen 7/18    monica will be on your desk soon in angelina's folder

## 2018-09-07 ENCOUNTER — TELEPHONE (OUTPATIENT)
Dept: FAMILY MEDICINE CLINIC | Facility: CLINIC | Age: 28
End: 2018-09-07

## 2018-09-07 NOTE — TELEPHONE ENCOUNTER
I Apologize for putting her on your schedule. Pt did not tell me that she was needing a refill on ADHD meds. Pt told me she just needed a 3 month FU w [facility] was booked . Should have looked at her med list :(     Halley     Pt is booked w Dr Jay next week.

## 2018-09-10 RX ORDER — DEXTROAMPHETAMINE SACCHARATE, AMPHETAMINE ASPARTATE, DEXTROAMPHETAMINE SULFATE AND AMPHETAMINE SULFATE 3.75; 3.75; 3.75; 3.75 MG/1; MG/1; MG/1; MG/1
15 TABLET ORAL 2 TIMES DAILY
Qty: 60 TABLET | Refills: 0 | Status: SHIPPED | OUTPATIENT
Start: 2018-09-10 | End: 2018-09-12 | Stop reason: SDUPTHER

## 2018-09-12 ENCOUNTER — OFFICE VISIT (OUTPATIENT)
Dept: FAMILY MEDICINE CLINIC | Facility: CLINIC | Age: 28
End: 2018-09-12

## 2018-09-12 VITALS
HEART RATE: 74 BPM | WEIGHT: 140.3 LBS | BODY MASS INDEX: 25.82 KG/M2 | DIASTOLIC BLOOD PRESSURE: 70 MMHG | OXYGEN SATURATION: 98 % | SYSTOLIC BLOOD PRESSURE: 102 MMHG | TEMPERATURE: 98.2 F | HEIGHT: 62 IN

## 2018-09-12 DIAGNOSIS — R41.840 ATTENTION OR CONCENTRATION DEFICIT: Primary | ICD-10-CM

## 2018-09-12 PROCEDURE — 99213 OFFICE O/P EST LOW 20 MIN: CPT | Performed by: FAMILY MEDICINE

## 2018-09-12 RX ORDER — DEXTROAMPHETAMINE SACCHARATE, AMPHETAMINE ASPARTATE, DEXTROAMPHETAMINE SULFATE AND AMPHETAMINE SULFATE 3.75; 3.75; 3.75; 3.75 MG/1; MG/1; MG/1; MG/1
15 TABLET ORAL 2 TIMES DAILY
Qty: 60 TABLET | Refills: 0 | OUTPATIENT
Start: 2018-11-09

## 2018-09-12 RX ORDER — DEXTROAMPHETAMINE SACCHARATE, AMPHETAMINE ASPARTATE, DEXTROAMPHETAMINE SULFATE AND AMPHETAMINE SULFATE 3.75; 3.75; 3.75; 3.75 MG/1; MG/1; MG/1; MG/1
15 TABLET ORAL 2 TIMES DAILY
Qty: 60 TABLET | Refills: 0 | Status: SHIPPED | OUTPATIENT
Start: 2018-09-12 | End: 2018-09-12 | Stop reason: SDUPTHER

## 2018-09-12 RX ORDER — DEXTROAMPHETAMINE SACCHARATE, AMPHETAMINE ASPARTATE, DEXTROAMPHETAMINE SULFATE AND AMPHETAMINE SULFATE 3.75; 3.75; 3.75; 3.75 MG/1; MG/1; MG/1; MG/1
15 TABLET ORAL 2 TIMES DAILY
Qty: 60 TABLET | Refills: 0 | Status: SHIPPED | OUTPATIENT
Start: 2018-09-12 | End: 2018-09-14 | Stop reason: SDUPTHER

## 2018-09-12 NOTE — PROGRESS NOTES
Subjective   Lisa Davenport is a 27 y.o. female presenting with   Chief Complaint   Patient presents with   • ADD     med refill   • Med Refill        27-year-old  white female nonsmoker here for routine follow-up for ADD.  She says the medication at its current dose is working quite well, and she does not have any side effects.  Specifically she denies any palpitations or anxiety or insomnia.    Her son, Tanesha, is now 1-year-old.  She is not breast-feeding and she does not have any plans to become pregnant      ADD          The following portions of the patient's history were reviewed and updated as appropriate: current medications, past family history, past medical history, past social history, past surgical history and problem list.    Review of Systems   All other systems reviewed and are negative.      Objective   Physical Exam   Constitutional: She is oriented to person, place, and time. She appears well-developed and well-nourished.   HENT:   Head: Normocephalic and atraumatic.   Eyes: Pupils are equal, round, and reactive to light. EOM are normal.   Neck: Normal range of motion. Neck supple.   Cardiovascular: Normal rate, regular rhythm, normal heart sounds and intact distal pulses.  Exam reveals no friction rub.    No murmur heard.  Pulmonary/Chest: Effort normal and breath sounds normal.   Musculoskeletal: Normal range of motion.   Neurological: She is alert and oriented to person, place, and time.   Skin: Skin is warm and dry.   Psychiatric: She has a normal mood and affect. Her behavior is normal.   Nursing note and vitals reviewed.      Assessment/Plan   Lisa was seen today for add and med refill.    Diagnoses and all orders for this visit:    Attention or concentration deficit    Other orders  -     amphetamine-dextroamphetamine (ADDERALL) 15 MG tablet; Take 1 tablet by mouth 2 (Two) Times a Day. Do not fill until 9/20/18 (she hasnt asked for sept yet)                   I would like him to  return for another visit in 3 month(s)

## 2018-09-13 ENCOUNTER — TELEPHONE (OUTPATIENT)
Dept: FAMILY MEDICINE CLINIC | Facility: CLINIC | Age: 28
End: 2018-09-13

## 2018-09-13 NOTE — TELEPHONE ENCOUNTER
Pt was seen in office yesterday and was given her adderall script. It was printed for 9/20/18. She has been out since the 10th and was waiting on the appointment.  Can you please write a new script she can  Friday. thanks

## 2018-09-14 RX ORDER — DEXTROAMPHETAMINE SACCHARATE, AMPHETAMINE ASPARTATE, DEXTROAMPHETAMINE SULFATE AND AMPHETAMINE SULFATE 3.75; 3.75; 3.75; 3.75 MG/1; MG/1; MG/1; MG/1
TABLET ORAL
Qty: 60 TABLET | Refills: 0 | Status: SHIPPED | OUTPATIENT
Start: 2018-09-14 | End: 2018-11-15 | Stop reason: SDUPTHER

## 2018-09-14 RX ORDER — DEXTROAMPHETAMINE SACCHARATE, AMPHETAMINE ASPARTATE, DEXTROAMPHETAMINE SULFATE AND AMPHETAMINE SULFATE 3.75; 3.75; 3.75; 3.75 MG/1; MG/1; MG/1; MG/1
15 TABLET ORAL 2 TIMES DAILY
Qty: 60 TABLET | Refills: 0 | Status: SHIPPED | OUTPATIENT
Start: 2018-09-14 | End: 2018-09-14 | Stop reason: SDUPTHER

## 2018-11-15 ENCOUNTER — TELEPHONE (OUTPATIENT)
Dept: FAMILY MEDICINE CLINIC | Facility: CLINIC | Age: 28
End: 2018-11-15

## 2018-11-15 RX ORDER — DEXTROAMPHETAMINE SACCHARATE, AMPHETAMINE ASPARTATE, DEXTROAMPHETAMINE SULFATE AND AMPHETAMINE SULFATE 3.75; 3.75; 3.75; 3.75 MG/1; MG/1; MG/1; MG/1
TABLET ORAL
Qty: 60 TABLET | Refills: 0 | Status: SHIPPED | OUTPATIENT
Start: 2018-11-15 | End: 2018-12-14 | Stop reason: SDUPTHER

## 2018-12-14 ENCOUNTER — OFFICE VISIT (OUTPATIENT)
Dept: FAMILY MEDICINE CLINIC | Facility: CLINIC | Age: 28
End: 2018-12-14

## 2018-12-14 VITALS
OXYGEN SATURATION: 98 % | HEIGHT: 62 IN | TEMPERATURE: 97.8 F | HEART RATE: 72 BPM | WEIGHT: 135.9 LBS | DIASTOLIC BLOOD PRESSURE: 60 MMHG | SYSTOLIC BLOOD PRESSURE: 110 MMHG | BODY MASS INDEX: 25.01 KG/M2

## 2018-12-14 DIAGNOSIS — R41.840 ATTENTION OR CONCENTRATION DEFICIT: Primary | ICD-10-CM

## 2018-12-14 PROCEDURE — 99213 OFFICE O/P EST LOW 20 MIN: CPT | Performed by: FAMILY MEDICINE

## 2018-12-14 RX ORDER — DEXTROAMPHETAMINE SACCHARATE, AMPHETAMINE ASPARTATE, DEXTROAMPHETAMINE SULFATE AND AMPHETAMINE SULFATE 3.75; 3.75; 3.75; 3.75 MG/1; MG/1; MG/1; MG/1
TABLET ORAL
Qty: 60 TABLET | Refills: 0 | Status: SHIPPED | OUTPATIENT
Start: 2018-12-14 | End: 2019-01-14 | Stop reason: SDUPTHER

## 2018-12-14 NOTE — PROGRESS NOTES
Subjective   Lisa Davenport is a 28 y.o. female presenting with   Chief Complaint   Patient presents with   • ADD   • Med Refill     adderall        28-year-old  white female nonsmoker here for routine follow-up for attention deficit disorder.  She has a 16-month-old son who is doing very well and she is not breast-feeding.  She is not pregnant.  She tells me the medication is working well, and she does not have any side effects.         The following portions of the patient's history were reviewed and updated as appropriate: current medications, past family history, past medical history, past social history, past surgical history and problem list.    Review of Systems   All other systems reviewed and are negative.      Objective   Physical Exam   Constitutional: She is oriented to person, place, and time. She appears well-developed and well-nourished.   HENT:   Head: Normocephalic and atraumatic.   Eyes: EOM are normal. Pupils are equal, round, and reactive to light.   Neck: Normal range of motion. Neck supple.   Cardiovascular: Normal rate and regular rhythm.   Pulmonary/Chest: Effort normal and breath sounds normal.   Musculoskeletal: Normal range of motion. She exhibits no edema or tenderness.   Neurological: She is alert and oriented to person, place, and time.   Skin: Skin is warm and dry.   Psychiatric: She has a normal mood and affect. Her behavior is normal.   Nursing note and vitals reviewed.      Assessment/Plan   Lisa was seen today for add and med refill.    Diagnoses and all orders for this visit:    Attention or concentration deficit    Other orders  -     amphetamine-dextroamphetamine (ADDERALL) 15 MG tablet; Take one twice daily                   I would like him to return for another visit in 3 month(s)

## 2018-12-14 NOTE — PATIENT INSTRUCTIONS
This is a very nice 28-year-old who is here for routine follow-up for ADD.  She is doing well so I have given her a refill.  I would like her to call if there is a problem.

## 2019-01-14 ENCOUNTER — TELEPHONE (OUTPATIENT)
Dept: FAMILY MEDICINE CLINIC | Facility: CLINIC | Age: 29
End: 2019-01-14

## 2019-01-14 RX ORDER — DEXTROAMPHETAMINE SACCHARATE, AMPHETAMINE ASPARTATE, DEXTROAMPHETAMINE SULFATE AND AMPHETAMINE SULFATE 3.75; 3.75; 3.75; 3.75 MG/1; MG/1; MG/1; MG/1
TABLET ORAL
Qty: 60 TABLET | Refills: 0 | Status: SHIPPED | OUTPATIENT
Start: 2019-01-14 | End: 2019-02-15 | Stop reason: SDUPTHER

## 2019-02-14 ENCOUNTER — TELEPHONE (OUTPATIENT)
Dept: FAMILY MEDICINE CLINIC | Facility: CLINIC | Age: 29
End: 2019-02-14

## 2019-02-14 NOTE — TELEPHONE ENCOUNTER
msg left for rf request adderrall 15 mg tablet.    Last rx 1/14/2019  Last ov 12/14/2018    monica 11/15/2018

## 2019-02-14 NOTE — TELEPHONE ENCOUNTER
Spoke to pt needs to get us a copy of the testing. She will work on it but doesn't know if she can do it.

## 2019-02-15 RX ORDER — DEXTROAMPHETAMINE SACCHARATE, AMPHETAMINE ASPARTATE, DEXTROAMPHETAMINE SULFATE AND AMPHETAMINE SULFATE 3.75; 3.75; 3.75; 3.75 MG/1; MG/1; MG/1; MG/1
TABLET ORAL
Qty: 60 TABLET | Refills: 0 | Status: SHIPPED | OUTPATIENT
Start: 2019-02-15 | End: 2019-03-14 | Stop reason: SDUPTHER

## 2019-02-15 NOTE — TELEPHONE ENCOUNTER
Done, remind her that she will need to find a new provider for further refills.  He retires in a month

## 2019-03-14 RX ORDER — DEXTROAMPHETAMINE SACCHARATE, AMPHETAMINE ASPARTATE, DEXTROAMPHETAMINE SULFATE AND AMPHETAMINE SULFATE 3.75; 3.75; 3.75; 3.75 MG/1; MG/1; MG/1; MG/1
TABLET ORAL
Qty: 60 TABLET | Refills: 0 | Status: SHIPPED | OUTPATIENT
Start: 2019-03-14 | End: 2021-04-26

## 2021-04-26 ENCOUNTER — OFFICE VISIT (OUTPATIENT)
Dept: OBSTETRICS AND GYNECOLOGY | Facility: CLINIC | Age: 31
End: 2021-04-26

## 2021-04-26 VITALS
SYSTOLIC BLOOD PRESSURE: 102 MMHG | WEIGHT: 139.2 LBS | DIASTOLIC BLOOD PRESSURE: 76 MMHG | HEIGHT: 62 IN | BODY MASS INDEX: 25.62 KG/M2

## 2021-04-26 DIAGNOSIS — Z01.419 PAP SMEAR, LOW-RISK: Primary | ICD-10-CM

## 2021-04-26 DIAGNOSIS — Z01.419 ROUTINE GYNECOLOGICAL EXAMINATION: ICD-10-CM

## 2021-04-26 LAB
B-HCG UR QL: NEGATIVE
BILIRUB BLD-MCNC: NEGATIVE MG/DL
CLARITY, POC: CLEAR
COLOR UR: YELLOW
GLUCOSE UR STRIP-MCNC: NEGATIVE MG/DL
INTERNAL NEGATIVE CONTROL: NEGATIVE
INTERNAL POSITIVE CONTROL: POSITIVE
KETONES UR QL: NEGATIVE
LEUKOCYTE EST, POC: NEGATIVE
Lab: 55
NITRITE UR-MCNC: NEGATIVE MG/ML
PH UR: 8 [PH] (ref 5–8)
PROT UR STRIP-MCNC: NEGATIVE MG/DL
RBC # UR STRIP: NEGATIVE /UL
SP GR UR: 1 (ref 1–1.03)
UROBILINOGEN UR QL: NORMAL

## 2021-04-26 PROCEDURE — 99385 PREV VISIT NEW AGE 18-39: CPT | Performed by: OBSTETRICS & GYNECOLOGY

## 2021-04-26 PROCEDURE — 81002 URINALYSIS NONAUTO W/O SCOPE: CPT | Performed by: OBSTETRICS & GYNECOLOGY

## 2021-04-26 PROCEDURE — 58301 REMOVE INTRAUTERINE DEVICE: CPT | Performed by: OBSTETRICS & GYNECOLOGY

## 2021-04-26 PROCEDURE — 81025 URINE PREGNANCY TEST: CPT | Performed by: OBSTETRICS & GYNECOLOGY

## 2021-04-26 RX ORDER — DEXTROAMPHETAMINE SACCHARATE, AMPHETAMINE ASPARTATE, DEXTROAMPHETAMINE SULFATE AND AMPHETAMINE SULFATE 7.5; 7.5; 7.5; 7.5 MG/1; MG/1; MG/1; MG/1
TABLET ORAL
COMMUNITY
Start: 2021-04-09 | End: 2021-07-19 | Stop reason: SDUPTHER

## 2021-04-26 NOTE — PROGRESS NOTES
"GYN Annual Exam     CC- Here for annual exam.     Pt new to practice? Yes  Pt new to me? Yes     Lisa Davenport is a 30 y.o.  female who presents for annual well woman exam. No LMP recorded. Patient has had an implant.    Problems in addition to need for annual: pt desires pregnancy so wants IUD removed.      HPI: History of Present Illness    PMHX:  Patient Active Problem List   Diagnosis   • Attention or concentration deficit   • Vaginal delivery   ; otherwise none    OB History        1    Para   1    Term   1            AB        Living   1       SAB        TAB        Ectopic        Molar        Multiple   0    Live Births   1                  No past medical history on file.    Past Surgical History:   Procedure Laterality Date   • WISDOM TOOTH EXTRACTION           Current Outpatient Medications:   •  amphetamine-dextroamphetamine (ADDERALL) 30 MG tablet, TAKE 1 TABLET (30 MG TOTAL) BY MOUTH 1 (ONE) TIME EACH DAY., Disp: , Rfl:     No Known Allergies    Social History     Tobacco Use   • Smoking status: Never Smoker   • Smokeless tobacco: Never Used   Substance Use Topics   • Alcohol use: Yes     Comment: social   • Drug use: No       Lisa Davenport  reports that she has never smoked. She has never used smokeless tobacco..           Family History   Problem Relation Age of Onset   • No Known Problems Mother    • No Known Problems Father        Review of Systems    Patient reports that she is not currently experiencing any symptoms of urinary incontinence.      noTESTED FOR CHLAMYDIA?    EXAM:  /76   Ht 157.5 cm (62\")   Wt 63.1 kg (139 lb 3.2 oz)   BMI 25.46 kg/m²     UA:   clr    Physical Exam  Vitals and nursing note reviewed. Exam conducted with a chaperone present.   Constitutional:       General: She is not in acute distress.     Appearance: She is well-developed. She is not diaphoretic.   HENT:      Head: Normocephalic and atraumatic.      Nose: Nose normal.   Eyes:      " Extraocular Movements: Extraocular movements intact.   Cardiovascular:      Rate and Rhythm: Normal rate.   Pulmonary:      Effort: Pulmonary effort is normal.   Chest:      Breasts: Breasts are symmetrical.         Right: Normal. No mass, nipple discharge, skin change or tenderness.         Left: Normal. No mass, nipple discharge, skin change or tenderness.   Abdominal:      General: There is no distension.      Palpations: Abdomen is soft. There is no mass.      Tenderness: There is no abdominal tenderness. There is no guarding.   Genitourinary:     General: Normal vulva.      Pubic Area: No rash.       Vagina: Normal. No vaginal discharge.      Cervix: Normal.      Uterus: Normal.       Adnexa: Right adnexa normal and left adnexa normal.            Comments: cx wnl, pap done, string seen.  Musculoskeletal:         General: No tenderness or deformity. Normal range of motion.      Cervical back: Normal range of motion.   Lymphadenopathy:      Upper Body:      Right upper body: No axillary adenopathy.      Left upper body: No axillary adenopathy.   Skin:     General: Skin is warm and dry.      Coloration: Skin is not pale.      Findings: No erythema or rash.   Neurological:      Mental Status: She is alert and oriented to person, place, and time.   Psychiatric:         Behavior: Behavior normal.         Thought Content: Thought content normal.         Judgment: Judgment normal.       IUD Removal Procedure Note    Chief Complaint: IUD removal    Procedures    Type of IUD:  Mirena  Date of insertion:  known  Reason for removal:  Desires pregnancy  Other relevant history/information:  none    Procedure Time Out Documentation      Procedure Details  IUD strings visible:  yes  Local anesthesia:  None  Tenaculum used:  None  Removal:  IUD strings grasped and IUD removed intact with gentle traction.  The patient tolerated the procedure well.    All appropriate instructions regarding removal were reviewed.    Tolerated  well  No apparent complications  Post procedure diagnosis : IUD removal     Plans for contraception:  no method    Other follow-up needed:  none    The patient was advised to call for any fever or for prolonged or severe pain or bleeding. She was advised to use NSAID as needed for mild to moderate pain.                As part of wellness and prevention, the following topics were discussed with the patient:  []  Nutrition  []  Physical activity/regular exercise   [x]  Healthy weight  []  Injury prevention  [x]  Substance misuse/abuse  []  Sexual behavior  []  STD prevention  []  Contaception  []  Dental health  [x]  Mental health  []  Immunization  [x]  Encouraged SBE     Counseling and guidance done:  Nutrition, physical activity, healthy weight, injury prevention, misuse of tobacco, alcohol and drugs, sexual behavior and STDs, contraception, dental health, mental health, immunizations breast cancer screening and exams.    Assessment     1) GYN annual well woman exam.   2) PAP done today? Yes  3) problems addressed: IUD removal               Plan       Follow up prn or one year.    Diagnoses and all orders for this visit:    1. Pap smear, low-risk (Primary)  -     IgP, Aptima HPV    2. Routine gynecological examination  -     POC Urinalysis Dipstick  -     POC Pregnancy, Urine  -     IgP, Aptima HPV        RTO Return in about 1 year (around 4/26/2022) for Annual physical.          Derek Charles MD  [unfilled]  10:54 EDT

## 2021-04-29 LAB
CYTOLOGIST CVX/VAG CYTO: NORMAL
CYTOLOGY CVX/VAG DOC CYTO: NORMAL
CYTOLOGY CVX/VAG DOC THIN PREP: NORMAL
DX ICD CODE: NORMAL
HIV 1 & 2 AB SER-IMP: NORMAL
HPV I/H RISK 4 DNA CVX QL PROBE+SIG AMP: NEGATIVE
Lab: NORMAL
OTHER STN SPEC: NORMAL
STAT OF ADQ CVX/VAG CYTO-IMP: NORMAL

## 2021-07-19 ENCOUNTER — OFFICE VISIT (OUTPATIENT)
Dept: OBSTETRICS AND GYNECOLOGY | Facility: CLINIC | Age: 31
End: 2021-07-19

## 2021-07-19 VITALS
DIASTOLIC BLOOD PRESSURE: 72 MMHG | WEIGHT: 136 LBS | HEIGHT: 62 IN | BODY MASS INDEX: 25.03 KG/M2 | SYSTOLIC BLOOD PRESSURE: 108 MMHG

## 2021-07-19 DIAGNOSIS — F98.8 ATTENTION DEFICIT DISORDER, UNSPECIFIED HYPERACTIVITY PRESENCE: ICD-10-CM

## 2021-07-19 DIAGNOSIS — Z13.71 SCREENING FOR GENETIC DISEASE CARRIER STATUS: ICD-10-CM

## 2021-07-19 DIAGNOSIS — N92.6 MISSED MENSES: Primary | ICD-10-CM

## 2021-07-19 LAB
B-HCG UR QL: POSITIVE
INTERNAL NEGATIVE CONTROL: NEGATIVE
INTERNAL POSITIVE CONTROL: POSITIVE
Lab: ABNORMAL

## 2021-07-19 PROCEDURE — 99213 OFFICE O/P EST LOW 20 MIN: CPT | Performed by: OBSTETRICS & GYNECOLOGY

## 2021-07-19 PROCEDURE — 81025 URINE PREGNANCY TEST: CPT | Performed by: OBSTETRICS & GYNECOLOGY

## 2021-07-19 RX ORDER — DEXTROAMPHETAMINE SACCHARATE, AMPHETAMINE ASPARTATE, DEXTROAMPHETAMINE SULFATE AND AMPHETAMINE SULFATE 7.5; 7.5; 7.5; 7.5 MG/1; MG/1; MG/1; MG/1
30 TABLET ORAL DAILY
COMMUNITY
Start: 2021-07-12 | End: 2021-08-11

## 2021-07-25 NOTE — PROGRESS NOTES
GYN Exam    CC- Here for missed menses    Lisa Davenport is a 30 y.o. female established patient who presents for missed menses. Her LMP is sure and is 2021. She is feeling okay so far, no VB. She is on Adderral 15 mg and plans on staying on it for now. Her brother has trisomy 22. She declines a referral to genetics but does want carrier screening. Her US today shows an embryo with + CA at 110 BMP at 6.4 weeks, confirms JAEL of LMP= 3/10/2022.      OB History        2    Para   1    Term   1            AB        Living   1       SAB        TAB        Ectopic        Molar        Multiple   0    Live Births   1          Obstetric Comments   2017- KARELY, Carlos 7.6 #  present             Menarche: 12  Current contraception: none  History of abnormal Pap smear: no  History of abnormal mammogram: no  Family history of uterine, colon or ovarian cancer: no  Family history of breast cancer: no  H/o STDs: none  Last pap:2021- normal pap/HPV  Gardasil:uncertain if she received the vaccine  DIVYA: none  Brother with trisomy 22    Health Maintenance   Topic Date Due   • ANNUAL PHYSICAL  Never done   • COVID-19 Vaccine (1) Never done   • INFLUENZA VACCINE  10/01/2021   • Annual Gynecologic Pelvic and Breast Exam  2022   • PAP SMEAR  2024   • TDAP/TD VACCINES (2 - Td or Tdap) 2027   • HEPATITIS C SCREENING  Completed   • Pneumococcal Vaccine 0-64  Aged Out       Past Medical History:   Diagnosis Date   • ADD (attention deficit disorder)        Past Surgical History:   Procedure Laterality Date   • WISDOM TOOTH EXTRACTION           Current Outpatient Medications:   •  amphetamine-dextroamphetamine (ADDERALL) 30 MG tablet, Take 30 mg by mouth Daily., Disp: , Rfl:     No Known Allergies    Social History     Tobacco Use   • Smoking status: Never Smoker   • Smokeless tobacco: Never Used   Substance Use Topics   • Alcohol use: Yes     Comment: social   • Drug use: No       Family History  "  Problem Relation Age of Onset   • No Known Problems Mother    • No Known Problems Father    • Breast cancer Neg Hx    • Ovarian cancer Neg Hx    • Uterine cancer Neg Hx    • Colon cancer Neg Hx    • Deep vein thrombosis Neg Hx    • Pulmonary embolism Neg Hx        Review of Systems   Constitutional: Positive for activity change and fatigue. Negative for appetite change, fever and unexpected weight change.   Eyes: Negative for photophobia and visual disturbance.   Respiratory: Negative for cough and shortness of breath.    Cardiovascular: Negative for chest pain and palpitations.   Gastrointestinal: Negative for abdominal distention, abdominal pain, constipation, diarrhea and nausea.   Endocrine: Negative for cold intolerance and heat intolerance.   Genitourinary: Negative for dyspareunia, dysuria, menstrual problem, pelvic pain, vaginal bleeding and vaginal discharge.   Musculoskeletal: Negative for back pain.   Skin: Negative for color change and rash.   Neurological: Negative for headaches.   Hematological: Negative for adenopathy. Does not bruise/bleed easily.   Psychiatric/Behavioral: Negative for dysphoric mood. The patient is not nervous/anxious.        /72   Ht 157.5 cm (62\")   Wt 61.7 kg (136 lb)   LMP 06/03/2021   BMI 24.87 kg/m²     Physical Exam  Vitals and nursing note reviewed.   Constitutional:       Appearance: Normal appearance. She is well-developed and normal weight.   HENT:      Head: Normocephalic and atraumatic.   Eyes:      General: No scleral icterus.     Conjunctiva/sclera: Conjunctivae normal.   Neck:      Thyroid: No thyromegaly.   Cardiovascular:      Rate and Rhythm: Normal rate and regular rhythm.   Pulmonary:      Effort: Pulmonary effort is normal.      Breath sounds: Normal breath sounds.   Abdominal:      General: Bowel sounds are normal. There is no distension.      Palpations: Abdomen is soft. There is no mass.      Tenderness: There is no abdominal tenderness. There is " no guarding or rebound.      Hernia: No hernia is present.   Musculoskeletal:      Cervical back: Neck supple.   Skin:     General: Skin is warm and dry.   Neurological:      Mental Status: She is alert and oriented to person, place, and time.   Psychiatric:         Mood and Affect: Mood normal.         Behavior: Behavior normal.         Thought Content: Thought content normal.         Judgment: Judgment normal.               Assessment/Plan  1) Missed menses- US confirms IUP at 6.4 weeks with JAEL 3/10/2022.   2) Check 47 carrier screen panel  3) ADHD- pt on Adderrall 15 mg QD  4) I saw the patient with a face mask, gloves and eye protection  The patient herself was masked.  Social distancing was observed as appropriate. Info on CV19 vaccine given.  5) RTO 2 weeks NOB and US viability.          Diagnoses and all orders for this visit:    1. Missed menses (Primary)  -     POC Pregnancy, Urine    2. Attention deficit disorder, unspecified hyperactivity presence          Moriah Hardy MD  07/19/2021  19:18 EDT

## 2021-08-02 ENCOUNTER — INITIAL PRENATAL (OUTPATIENT)
Dept: OBSTETRICS AND GYNECOLOGY | Facility: CLINIC | Age: 31
End: 2021-08-02

## 2021-08-02 VITALS — BODY MASS INDEX: 25.7 KG/M2 | WEIGHT: 140.5 LBS | DIASTOLIC BLOOD PRESSURE: 62 MMHG | SYSTOLIC BLOOD PRESSURE: 116 MMHG

## 2021-08-02 DIAGNOSIS — Z36.9 ENCOUNTER FOR ANTENATAL SCREENING, UNSPECIFIED: ICD-10-CM

## 2021-08-02 DIAGNOSIS — Z34.91 INITIAL OBSTETRIC VISIT IN FIRST TRIMESTER: Primary | ICD-10-CM

## 2021-08-02 LAB
GLUCOSE UR STRIP-MCNC: NEGATIVE MG/DL
PROT UR STRIP-MCNC: NEGATIVE MG/DL

## 2021-08-02 PROCEDURE — 0501F PRENATAL FLOW SHEET: CPT | Performed by: NURSE PRACTITIONER

## 2021-08-02 RX ORDER — PNV NO.95/FERROUS FUM/FOLIC AC 28MG-0.8MG
TABLET ORAL DAILY
COMMUNITY
End: 2021-10-06 | Stop reason: SDUPTHER

## 2021-08-02 NOTE — PROGRESS NOTES
Initial ob visit     Chief Complaint   Patient presents with   • Initial Prenatal Visit       Lisa Davenport is being seen today for her first obstetrical visit.  She is a 30 y.o.  @  8w4d gestation. She reports she is feeling well. She does have occas nausea and vomiting. She is using catrina.     # 1 - Date: 17, Sex: Male, Weight: None, GA: 41w1d, Delivery: Vaginal, Spontaneous, Apgar1: 8, Apgar5: 9, Living: Living, Birth Comments: None    # 2 - Date: None, Sex: None, Weight: None, GA: None, Delivery: None, Apgar1: None, Apgar5: None, Living: None, Birth Comments: None      LNMP: 6/3/21  Confident with date: Yes  Taking prenatal vitamins: Yes  Planned pregnancy: Yes  Prior obstetric issues, potential pregnancy concerns: denies  Family history of genetic issues (includes FOB): Pt's brother has T22  Prior infections concerning in pregnancy (Rash, fever in last 2 weeks): denies   Varicella Hx: As childc  Flu vaccine: 2021  COVID vaccine: S/P COVID x 2   History of STDs: denies HSV  Current medications: PNV and adderrall   Last pap smear:   Smoker: No  Drug or alcohol abuse: No  Mental health issues: denies  Physical, emotional or sexual abuse: Denies  Prior testing for Cystic Fibrosis Carrier or Sickle Cell Trait- no  Prepregnancy BMI: Body mass index is 25.7 kg/m².      Past Medical History:   Diagnosis Date   • ADD (attention deficit disorder)        Past Surgical History:   Procedure Laterality Date   • WISDOM TOOTH EXTRACTION           Current Outpatient Medications:   •  Prenatal Vit-Fe Fumarate-FA (prenatal vitamin 28-0.8) 28-0.8 MG tablet tablet, Take  by mouth Daily., Disp: , Rfl:   •  amphetamine-dextroamphetamine (ADDERALL) 30 MG tablet, Take 30 mg by mouth Daily., Disp: , Rfl:     No Known Allergies    Social History     Socioeconomic History   • Marital status: Single     Spouse name: Not on file   • Number of children: Not on file   • Years of education: Not on file   • Highest  education level: Not on file   Tobacco Use   • Smoking status: Never Smoker   • Smokeless tobacco: Never Used   Substance and Sexual Activity   • Alcohol use: Yes     Comment: social   • Drug use: No   • Sexual activity: Yes     Partners: Male     Birth control/protection: None       Family History   Problem Relation Age of Onset   • No Known Problems Mother    • No Known Problems Father    • Breast cancer Neg Hx    • Ovarian cancer Neg Hx    • Uterine cancer Neg Hx    • Colon cancer Neg Hx    • Deep vein thrombosis Neg Hx    • Pulmonary embolism Neg Hx        Review of systems     All other systems reviewed and are negative except for: Gastrointestinal: positive for nausea and vomiting     Objective    /62   Wt 63.7 kg (140 lb 8 oz)   LMP 06/03/2021   BMI 25.70 kg/m²       General Appearance:    Alert, cooperative, in no acute distress, habitus normal    Head:    Not examined   Eyes:           Not examined   Ears:  Not examined       Neck:  No thyroid enlargement or nodules present   Back:     No kyphosis present, no scoliosis present,                       Lungs:     Clear to auscultation,respirations regular, even and                   unlabored    Heart:    Regular rhythm and normal rate, normal S1 and S2, no            murmur, no gallop, no rub, no click   Breast Exam:    Def   Abdomen:     Normal bowel sounds, no masses, no organomegaly, soft        non-tender, non-distended, no guarding, no rebound                 tenderness   Genitalia:    Vulva - No masses, no atrophy, no lesions    Vagina - No discharge, No bleeding    Cervix - No Lesions, closed. Pap collected:No     Uterus - Consistent with 8 weeks.     Adnexa - No masses, non tender       Extremities:   Moves all extremities well, no edema, no cyanosis, no              redness       Skin:   No bleeding, bruising or rash   Lymph nodes:   No palpable adenopathy   Neurologic:   Sensation intact, A&O times 3      Assessment/Plan    1) Pregnancy at  8w4d- US IMP: Single, viable IUP @ 8.4wks. EDC 3/10/22. . US findings discussed with patient. EDC established 3/10/22 and confirmed by US and LNMP.     2) OB exam: OB exam completed: Yes. New OB bag provided Yes. Pap collected: No.    3) Labs: OB labs collected: Yes Counseled on genetic screening: Yes, she desires Expanded carrier screen. Counseled on Quad screen and AFP: No, she is too early for AFP. Counseled on NIPS: Yes, she desires NIPS at her next appt.     4) Patient's Body mass index is 25.7 kg/m². indicating that she is within normal range (BMI 18.5-24.9). No BMI management plan needed. For women with a normal weight, with a BMI between 18.5-24.5 before pregnancy, the recommended weight gain is 25-35 pounds for the entire pregnancy     5)  Prenatal care: Oriented to the office and prenatal care. Encourage prenatal vitamins. Disc Tylenol products are fine, avoid aspirin and ibuprofen; Zika (travel restrictions/ok to use insect repellant); not to change cat litter; food restrictions; exercise;  avoidance of alcohol, tobacco, drugs and saunas/hot tubs.     6) Family h/o Trisomy 22- Pt's brother. Check NIPS at next visit.     7) ADHD- taking Adderrall 15mg daily. She plans to continue.     8) Nausea and vomiting- Rec Vit B6 and Unisom. Enc small frequent meals.     9) Headache- Safe med list provided. Enc adequate H20. Disc use of Vit B2 and Magnesium supplement for prevention.     All questions answered.     RTO 4 weeks for OB tummy and NIPS    Ember Munoz, TAMMI  8/2/2021  11:45 EDT

## 2021-08-03 LAB
ABO GROUP BLD: NORMAL
BASOPHILS # BLD AUTO: 0 X10E3/UL (ref 0–0.2)
BASOPHILS NFR BLD AUTO: 0 %
BLD GP AB SCN SERPL QL: NEGATIVE
EOSINOPHIL # BLD AUTO: 0.1 X10E3/UL (ref 0–0.4)
EOSINOPHIL NFR BLD AUTO: 1 %
ERYTHROCYTE [DISTWIDTH] IN BLOOD BY AUTOMATED COUNT: 12.1 % (ref 11.7–15.4)
HBA1C MFR BLD: 5 % (ref 4.8–5.6)
HBV SURFACE AG SERPL QL IA: NEGATIVE
HCT VFR BLD AUTO: 36.4 % (ref 34–46.6)
HCV AB S/CO SERPL IA: <0.1 S/CO RATIO (ref 0–0.9)
HGB BLD-MCNC: 12.2 G/DL (ref 11.1–15.9)
HIV 1+2 AB+HIV1 P24 AG SERPL QL IA: NON REACTIVE
IMM GRANULOCYTES # BLD AUTO: 0 X10E3/UL (ref 0–0.1)
IMM GRANULOCYTES NFR BLD AUTO: 0 %
LYMPHOCYTES # BLD AUTO: 1.7 X10E3/UL (ref 0.7–3.1)
LYMPHOCYTES NFR BLD AUTO: 26 %
MCH RBC QN AUTO: 32.4 PG (ref 26.6–33)
MCHC RBC AUTO-ENTMCNC: 33.5 G/DL (ref 31.5–35.7)
MCV RBC AUTO: 97 FL (ref 79–97)
MONOCYTES # BLD AUTO: 0.7 X10E3/UL (ref 0.1–0.9)
MONOCYTES NFR BLD AUTO: 10 %
NEUTROPHILS # BLD AUTO: 4.1 X10E3/UL (ref 1.4–7)
NEUTROPHILS NFR BLD AUTO: 63 %
PLATELET # BLD AUTO: 274 X10E3/UL (ref 150–450)
RBC # BLD AUTO: 3.77 X10E6/UL (ref 3.77–5.28)
RH BLD: POSITIVE
RPR SER QL: NON REACTIVE
RUBV IGG SERPL IA-ACNC: 4.06 INDEX
VZV IGG SER IA-ACNC: 378 INDEX
WBC # BLD AUTO: 6.5 X10E3/UL (ref 3.4–10.8)

## 2021-08-04 LAB
AMPHETAMINES UR QL SCN: NEGATIVE NG/ML
BACTERIA UR CULT: NO GROWTH
BACTERIA UR CULT: NORMAL
BARBITURATES UR QL SCN: NEGATIVE NG/ML
BENZODIAZ UR QL SCN: NEGATIVE NG/ML
BZE UR QL SCN: NEGATIVE NG/ML
CANNABINOIDS UR QL SCN: NEGATIVE NG/ML
CREAT UR-MCNC: 96.6 MG/DL (ref 20–300)
LABORATORY COMMENT REPORT: NORMAL
METHADONE UR QL SCN: NEGATIVE NG/ML
OPIATES UR QL SCN: NEGATIVE NG/ML
OXYCODONE+OXYMORPHONE UR QL SCN: NEGATIVE NG/ML
PCP UR QL: NEGATIVE NG/ML
PH UR: 7.7 [PH] (ref 4.5–8.9)
PROPOXYPH UR QL SCN: NEGATIVE NG/ML

## 2021-08-12 NOTE — PATIENT INSTRUCTIONS
This is a very nice 27-year-old who is here for follow-up.  She appears to be doing very well so I have renewed her prescription.  I would like her to call if there is a problem.   0 = understands/communicates without difficulty

## 2021-08-18 PROBLEM — Z14.8 GENETIC CARRIER OF OTHER DISEASE: Status: ACTIVE | Noted: 2021-08-18

## 2021-09-07 ENCOUNTER — ROUTINE PRENATAL (OUTPATIENT)
Dept: OBSTETRICS AND GYNECOLOGY | Facility: CLINIC | Age: 31
End: 2021-09-07

## 2021-09-07 VITALS — BODY MASS INDEX: 26.16 KG/M2 | WEIGHT: 143 LBS | SYSTOLIC BLOOD PRESSURE: 112 MMHG | DIASTOLIC BLOOD PRESSURE: 60 MMHG

## 2021-09-07 DIAGNOSIS — Z14.8 GENETIC CARRIER OF OTHER DISEASE: ICD-10-CM

## 2021-09-07 DIAGNOSIS — Z34.92 NORMAL PREGNANCY IN SECOND TRIMESTER: Primary | ICD-10-CM

## 2021-09-07 PROBLEM — Z34.91 INITIAL OBSTETRIC VISIT IN FIRST TRIMESTER: Status: RESOLVED | Noted: 2021-08-02 | Resolved: 2021-09-07

## 2021-09-07 LAB
GLUCOSE UR STRIP-MCNC: NEGATIVE MG/DL
PROT UR STRIP-MCNC: NEGATIVE MG/DL

## 2021-09-07 PROCEDURE — 0502F SUBSEQUENT PRENATAL CARE: CPT | Performed by: OBSTETRICS & GYNECOLOGY

## 2021-09-07 RX ORDER — DEXTROAMPHETAMINE SACCHARATE, AMPHETAMINE ASPARTATE, DEXTROAMPHETAMINE SULFATE AND AMPHETAMINE SULFATE 7.5; 7.5; 7.5; 7.5 MG/1; MG/1; MG/1; MG/1
30 TABLET ORAL DAILY
COMMUNITY
Start: 2021-07-12 | End: 2021-10-06 | Stop reason: SDUPTHER

## 2021-09-07 RX ORDER — PNV NO.95/FERROUS FUM/FOLIC AC 28MG-0.8MG
TABLET ORAL DAILY
COMMUNITY
End: 2021-10-06 | Stop reason: SDUPTHER

## 2021-09-07 NOTE — PROGRESS NOTES
OB follow up     Lisa Davenport is a 30 y.o.  13w5d being seen today for her obstetrical visit.  Patient reports no bleeding, no contractions and no leaking. Fetal movement: absent.     Review of Systems  No bleeding, No cramping/contractions     /60   Wt 64.9 kg (143 lb)   LMP 2021   BMI 26.16 kg/m²     FHT: present BPM   Uterine Size: 13 cm       Assessment/Plan:    1) 30 y.o.  -pregnancy at 13w5d    2)   Encounter Diagnoses   Name Primary?   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    3) Reviewed this stage of pregnancy  4) Problem list updated     Return in about 4 weeks (around 10/5/2021) for OB Tummy.      Ethan Myers MD    2021  15:06 EDT

## 2021-10-06 ENCOUNTER — ROUTINE PRENATAL (OUTPATIENT)
Dept: OBSTETRICS AND GYNECOLOGY | Facility: CLINIC | Age: 31
End: 2021-10-06

## 2021-10-06 VITALS — SYSTOLIC BLOOD PRESSURE: 110 MMHG | WEIGHT: 151 LBS | DIASTOLIC BLOOD PRESSURE: 62 MMHG | BODY MASS INDEX: 27.62 KG/M2

## 2021-10-06 DIAGNOSIS — Z3A.17 17 WEEKS GESTATION OF PREGNANCY: Primary | ICD-10-CM

## 2021-10-06 DIAGNOSIS — Z14.8 GENETIC CARRIER OF OTHER DISEASE: ICD-10-CM

## 2021-10-06 PROBLEM — Z34.90 PREGNANCY: Status: ACTIVE | Noted: 2021-10-06

## 2021-10-06 LAB
GLUCOSE UR STRIP-MCNC: NEGATIVE MG/DL
PROT UR STRIP-MCNC: NEGATIVE MG/DL

## 2021-10-06 PROCEDURE — 90686 IIV4 VACC NO PRSV 0.5 ML IM: CPT | Performed by: OBSTETRICS & GYNECOLOGY

## 2021-10-06 PROCEDURE — 0502F SUBSEQUENT PRENATAL CARE: CPT | Performed by: OBSTETRICS & GYNECOLOGY

## 2021-10-06 PROCEDURE — 90471 IMMUNIZATION ADMIN: CPT | Performed by: OBSTETRICS & GYNECOLOGY

## 2021-10-06 RX ORDER — PNV NO.95/FERROUS FUM/FOLIC AC 28MG-0.8MG
TABLET ORAL DAILY
COMMUNITY

## 2021-10-06 RX ORDER — DEXTROAMPHETAMINE SACCHARATE, AMPHETAMINE ASPARTATE, DEXTROAMPHETAMINE SULFATE AND AMPHETAMINE SULFATE 7.5; 7.5; 7.5; 7.5 MG/1; MG/1; MG/1; MG/1
30 TABLET ORAL DAILY
COMMUNITY
Start: 2021-09-22 | End: 2021-10-22

## 2021-10-06 NOTE — PROGRESS NOTES
Pt is a 30 y.o. @17w6d here for ob check. Pt had GALT gene results explained.  Desires AFP.   I saw the patient with a face mask, gloves and eye protection  The patient herself was masked.  Social distancing was observed as appropriate. All COVID precautions observed. Pt was covid vaccinated.   Pt desires flu shot.

## 2021-10-08 LAB
AFP ADJ MOM SERPL: 1
AFP INTERP SERPL-IMP: NORMAL
AFP INTERP SERPL-IMP: NORMAL
AFP SERPL-MCNC: 43.3 NG/ML
AGE AT DELIVERY: 31.3 YR
GA METHOD: NORMAL
GA: 17.9 WEEKS
IDDM PATIENT QL: NORMAL
LABORATORY COMMENT REPORT: NORMAL
MULTIPLE PREGNANCY: NO
NEURAL TUBE DEFECT RISK FETUS: NORMAL %
RESULT: NORMAL

## 2021-10-22 DIAGNOSIS — O43.199 MARGINAL INSERTION OF UMBILICAL CORD AFFECTING MANAGEMENT OF MOTHER: Primary | ICD-10-CM

## 2021-10-26 ENCOUNTER — OFFICE VISIT (OUTPATIENT)
Dept: OBSTETRICS AND GYNECOLOGY | Facility: CLINIC | Age: 31
End: 2021-10-26

## 2021-10-26 ENCOUNTER — HOSPITAL ENCOUNTER (OUTPATIENT)
Dept: ULTRASOUND IMAGING | Facility: HOSPITAL | Age: 31
Discharge: HOME | End: 2021-10-26
Admitting: OBSTETRICS & GYNECOLOGY

## 2021-10-26 VITALS
TEMPERATURE: 98.2 F | WEIGHT: 158 LBS | HEIGHT: 63 IN | DIASTOLIC BLOOD PRESSURE: 55 MMHG | SYSTOLIC BLOOD PRESSURE: 105 MMHG | BODY MASS INDEX: 28 KG/M2 | HEART RATE: 81 BPM

## 2021-10-26 DIAGNOSIS — O43.199 MARGINAL INSERTION OF UMBILICAL CORD AFFECTING MANAGEMENT OF MOTHER: ICD-10-CM

## 2021-10-26 DIAGNOSIS — O43.199 MARGINAL INSERTION OF UMBILICAL CORD AFFECTING MANAGEMENT OF MOTHER: Primary | ICD-10-CM

## 2021-10-26 PROCEDURE — 76811 OB US DETAILED SNGL FETUS: CPT

## 2021-10-26 PROCEDURE — 76811 OB US DETAILED SNGL FETUS: CPT | Performed by: OBSTETRICS & GYNECOLOGY

## 2021-10-26 PROCEDURE — 99214 OFFICE O/P EST MOD 30 MIN: CPT | Performed by: OBSTETRICS & GYNECOLOGY

## 2021-10-27 NOTE — PROGRESS NOTES
"Dear   Thank-you for referring Lisa Davenport for a Maternal Fetal Medicine consult. As you know Ms. Lisa Davenport is a 31 y.o.  @ 20w6d /7 weeks who was diagnosed with a velamentous cord insertion.  Patient denies vaginal bleeding.    The remainder of her pregnancy has been uncomplicated.    OB History    Para Term  AB Living   2 1 1     1   SAB IAB Ectopic Molar Multiple Live Births           0 1      # Outcome Date GA Lbr [place]/2nd Weight Sex Delivery Anes PTL Lv   2 Current            1 Term 17 41w1d 08:47 / 01:11  M Vag-Spont EPI N CHERELLE      Obstetric Comments   2017- Carlos CALI 7.6 #   present        has a past medical history of ADD (attention deficit disorder).     has a past surgical history that includes [place] tooth extraction.    No Known Allergies      Current Outpatient Medications:   •  Prenatal Vit-Fe Fumarate-FA (prenatal vitamin 28-0.8) 28-0.8 MG tablet tablet, Take  by mouth Daily., Disp: , Rfl:      reports that she has never smoked. She has never used smokeless tobacco. She reports previous alcohol use. She reports that she does not use drugs.    family history includes No Known Problems in her father and mother.    ROS: unremarkable    Well nourished, well developed female in no acute distress  Blood pressure 105/55, pulse 81, temperature 98.2 °F (36.8 °C), height 160 cm (63\"), weight 71.7 kg (158 lb), last menstrual period 2021, not currently breastfeeding.  Abd: soft, nontender, gravid  Ext: no edema, non-tender    Today's ultrasound reveals succenturiate lobe with velamentous cord insertion  Abnormal cord insertions are weakly associated with growth abnormalities.    Questions were answered.    Recommendations:  Growth ultrasound 30-34 weeks  Follow up as clinically indicated      Again thank-you for referring for a maternal fetal medicine consult. If we can be of any further assistance to you please do not hesitate to contact us.  Total consult time " 30 minutes with greater than 50% spent in counseling    Again thank you for requesting a MFM consult.  If we can be of any further assistance to you, please do not hesitate to contact us.    Uzma Restrepo MD  MATERNAL FETAL MEDICINE              VISIT SYNOPSIS:    ASSESSMENT/PLAN:  MS. Lisa Davenport is a 31 y.o.  at 20w6d with the following visit diagnosis    Diagnoses and all orders for this visit:    1. 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 Myers     At the end of this consultation all patient questions were answered, concerns addressed, and comprehensive management plan and follow up reviewed with patient.      I spent 30 minutes caring for Lisa on this date of service. This time includes time spent by me in the following activities: reviewing tests, obtaining and/or reviewing a separately obtained history, performing a medically appropriate examination and/or evaluation, counseling and educating the patient/family/caregiver and independently interpreting results and communicating that information with the patient/family/caregiver with greater than 50% spent in counseling and coordination of care.

## 2021-11-02 ENCOUNTER — ROUTINE PRENATAL (OUTPATIENT)
Dept: OBSTETRICS AND GYNECOLOGY | Facility: CLINIC | Age: 31
End: 2021-11-02

## 2021-11-02 VITALS — SYSTOLIC BLOOD PRESSURE: 110 MMHG | DIASTOLIC BLOOD PRESSURE: 62 MMHG | BODY MASS INDEX: 28.34 KG/M2 | WEIGHT: 160 LBS

## 2021-11-02 DIAGNOSIS — O43.199 MARGINAL INSERTION OF UMBILICAL CORD AFFECTING MANAGEMENT OF MOTHER: ICD-10-CM

## 2021-11-02 DIAGNOSIS — Z3A.21 21 WEEKS GESTATION OF PREGNANCY: Primary | ICD-10-CM

## 2021-11-02 LAB
GLUCOSE UR STRIP-MCNC: NEGATIVE MG/DL
PROT UR STRIP-MCNC: NEGATIVE MG/DL

## 2021-11-02 PROCEDURE — 0502F SUBSEQUENT PRENATAL CARE: CPT | Performed by: OBSTETRICS & GYNECOLOGY

## 2021-11-02 NOTE — PROGRESS NOTES
OB follow up     Lisa Davenport is a 31 y.o.  21w5d being seen today for her obstetrical visit.  Patient reports no bleeding, no contractions and no leaking. Fetal movement: normal.  Patient has a known accessory lobe with a velamentous insertion of the cord.  She was seen by M last week.    Review of Systems  No bleeding, No cramping/contractions     /62   Wt 72.6 kg (160 lb)   LMP 2021   BMI 28.34 kg/m²     FHT:   BPM   Uterine Size:     Ultrasound and consult from MFM reviewed with the patient.  Normal growth.  Accessory lobe seen in velamentous insertion confirmed.  Recommend growth between 30 and 34 weeks.  No recommendations were made for primary elective  delivery.    Assessment/Plan:    1) 31 y.o.  -pregnancy at 21w5d    2)   Encounter Diagnoses   Name Primary?   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  Growth ultrasound between 30 and 34 weeks.  2-hour GTT at the appropriate time.    3) Reviewed this stage of pregnancy  4) Problem list updated     Return in about 4 weeks (around 2021) for OB Tummy.      Ethan Myers MD    2021  13:36 EDT

## 2021-11-30 ENCOUNTER — ROUTINE PRENATAL (OUTPATIENT)
Dept: OBSTETRICS AND GYNECOLOGY | Facility: CLINIC | Age: 31
End: 2021-11-30

## 2021-11-30 VITALS — WEIGHT: 161 LBS | BODY MASS INDEX: 28.52 KG/M2 | SYSTOLIC BLOOD PRESSURE: 122 MMHG | DIASTOLIC BLOOD PRESSURE: 70 MMHG

## 2021-11-30 DIAGNOSIS — Z3A.25 25 WEEKS GESTATION OF PREGNANCY: Primary | ICD-10-CM

## 2021-11-30 PROBLEM — J11.1 INFLUENZA: Status: ACTIVE | Noted: 2021-11-30

## 2021-11-30 LAB
GLUCOSE UR STRIP-MCNC: NEGATIVE MG/DL
PROT UR STRIP-MCNC: NEGATIVE MG/DL

## 2021-11-30 PROCEDURE — 0502F SUBSEQUENT PRENATAL CARE: CPT | Performed by: OBSTETRICS & GYNECOLOGY

## 2021-11-30 RX ORDER — OSELTAMIVIR PHOSPHATE 75 MG/1
75 CAPSULE ORAL
COMMUNITY
Start: 2021-11-26 | End: 2021-12-01

## 2021-12-28 ENCOUNTER — ROUTINE PRENATAL (OUTPATIENT)
Dept: OBSTETRICS AND GYNECOLOGY | Facility: CLINIC | Age: 31
End: 2021-12-28

## 2021-12-28 VITALS — SYSTOLIC BLOOD PRESSURE: 112 MMHG | BODY MASS INDEX: 29.23 KG/M2 | WEIGHT: 165 LBS | DIASTOLIC BLOOD PRESSURE: 72 MMHG

## 2021-12-28 DIAGNOSIS — Z36.9 ENCOUNTER FOR ANTENATAL SCREENING, UNSPECIFIED: ICD-10-CM

## 2021-12-28 DIAGNOSIS — O99.891 BACK PAIN AFFECTING PREGNANCY IN THIRD TRIMESTER: ICD-10-CM

## 2021-12-28 DIAGNOSIS — M54.9 BACK PAIN AFFECTING PREGNANCY IN THIRD TRIMESTER: ICD-10-CM

## 2021-12-28 DIAGNOSIS — M25.559 HIP PAIN: ICD-10-CM

## 2021-12-28 DIAGNOSIS — Z34.93 PRENATAL CARE IN THIRD TRIMESTER: Primary | ICD-10-CM

## 2021-12-28 LAB
GLUCOSE UR STRIP-MCNC: NEGATIVE MG/DL
PROT UR STRIP-MCNC: NEGATIVE MG/DL

## 2021-12-28 PROCEDURE — 0502F SUBSEQUENT PRENATAL CARE: CPT | Performed by: NURSE PRACTITIONER

## 2021-12-28 RX ORDER — DEXTROAMPHETAMINE SACCHARATE, AMPHETAMINE ASPARTATE, DEXTROAMPHETAMINE SULFATE AND AMPHETAMINE SULFATE 7.5; 7.5; 7.5; 7.5 MG/1; MG/1; MG/1; MG/1
30 TABLET ORAL DAILY
COMMUNITY
Start: 2021-12-05 | End: 2022-01-04

## 2021-12-28 NOTE — PROGRESS NOTES
OB follow up > 20 weeks    Chief Complaint   Patient presents with   • Routine Prenatal Visit       Lisa Davenport is a 31 y.o.  29w5d being seen today for her obstetrical visit.  Patient reports hip and back pain while working. 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 Reports the pain affects her ability to walk when it occurs. She has had to be given a chair to prevent her from falling before while working. She is wearing a pregnancy support belt at work.  Taking prenatal vitamins: Yes      Review of Systems  Constitutional: neg fatigue  Cardiovascular: neg edema  Gastrointestinal: neg n/v  Genitourinary: Negative for contractions, cramping, vaginal bleeding, or SROM.   Fetal movement: normal  No Known Allergies     /72   Wt 74.8 kg (165 lb)   LMP 2021   BMI 29.23 kg/m²     FHT: present BPM   Uterine Size: size equals dates       Assessment    1) pregnancy at 29w5d- 2hr GTT in progress. Rh +.     2) Velamentous insertion of cord and accessory lobe of placenta- MFM rec growth US 30-34 weeks     3) S/P Flu vaccine    4) Tdap vaccine- Disc that all pregnant women should get a Tdap shot in the third trimester, preferably between 27 weeks and 36 weeks of pregnancy. The Tdap shot is an effective and safe way to protect the baby from serious illness and complications of pertussis. Recommend that partners, family members, and infant caregivers should be up to date on theTdap vaccine if they have not previously been vaccinated. Ideally, all family members should be vaccinated at least 2 weeks before coming in contact with the . If not administered during pregnancy, the Tdap vaccine should be given immediately postpartum if the patient is not UTD on Tdap.      5) COVID 19- COVID19 precautions were reviewed with the patient. Continue to encourage social distancing, wearing a mask, and good hand hygiene.  I wore a mask, protective eye wear, and gloves during this  patient encounter.  Patient also wearing a surgical mask and social distancing was observed. Hand hygeine performed before and after seeing the patient. 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     7) Hip and back pain- radiates to her legs. Denies contractions. Occurs while on her feet at work. Is wearing a pregnancy support belt. She has cut her hours back at work. Offer PT ref, pt to consider.     Plan    Continue prenatal vitamins  Reviewed this stage of pregnancy  Problem list updated   Follow up in 2 weeks for OB tummy and growth US     Ember Munoz, TAMMI  12/28/2021  10:05 EST

## 2021-12-29 LAB
GLUCOSE 1H P 75 G GLC PO SERPL-MCNC: 111 MG/DL (ref 65–179)
GLUCOSE 2H P 75 G GLC PO SERPL-MCNC: 91 MG/DL (ref 65–152)
GLUCOSE P FAST SERPL-MCNC: 82 MG/DL (ref 65–91)
HCT VFR BLD AUTO: 33.6 % (ref 34–46.6)
HGB BLD-MCNC: 11.4 G/DL (ref 11.1–15.9)

## 2021-12-29 RX ORDER — DOXYCYCLINE HYCLATE 50 MG/1
324 CAPSULE, GELATIN COATED ORAL 2 TIMES DAILY
Qty: 60 TABLET | Refills: 2 | Status: SHIPPED | OUTPATIENT
Start: 2021-12-29 | End: 2022-03-24

## 2022-01-17 ENCOUNTER — ROUTINE PRENATAL (OUTPATIENT)
Dept: OBSTETRICS AND GYNECOLOGY | Facility: CLINIC | Age: 32
End: 2022-01-17

## 2022-01-17 VITALS — DIASTOLIC BLOOD PRESSURE: 68 MMHG | SYSTOLIC BLOOD PRESSURE: 108 MMHG | BODY MASS INDEX: 30.82 KG/M2 | WEIGHT: 174 LBS

## 2022-01-17 DIAGNOSIS — Z34.93 PRENATAL CARE IN THIRD TRIMESTER: Primary | ICD-10-CM

## 2022-01-17 DIAGNOSIS — O43.199 MARGINAL INSERTION OF UMBILICAL CORD AFFECTING MANAGEMENT OF MOTHER: ICD-10-CM

## 2022-01-17 DIAGNOSIS — Z14.8 GENETIC CARRIER OF OTHER DISEASE: ICD-10-CM

## 2022-01-17 LAB
GLUCOSE UR STRIP-MCNC: NEGATIVE MG/DL
PROT UR STRIP-MCNC: NEGATIVE MG/DL

## 2022-01-17 PROCEDURE — 0502F SUBSEQUENT PRENATAL CARE: CPT | Performed by: NURSE PRACTITIONER

## 2022-01-17 PROCEDURE — 90471 IMMUNIZATION ADMIN: CPT | Performed by: NURSE PRACTITIONER

## 2022-01-17 PROCEDURE — 90715 TDAP VACCINE 7 YRS/> IM: CPT | Performed by: NURSE PRACTITIONER

## 2022-01-17 NOTE — PROGRESS NOTES
OB follow up > 20 weeks    Chief Complaint   Patient presents with   • Routine Prenatal Visit       Lisa Davenport is a 31 y.o.  32w4d being seen today for her obstetrical visit.  She had a growth US today. She continues to have back and hip pain. Reports she was off last week for vacation and felt better while she was off of work compared to while working. She has not been contacted by PT per her report.     Review of Systems  Constitutional: neg fatigue  Cardiovascular: neg edema  Gastrointestinal: neg n/v  Genitourinary: Negative for contractions, cramping, vaginal bleeding, or SROM. +  Hip and back pain   Fetal movement: normal  No Known Allergies     /68   Wt 78.9 kg (174 lb)   LMP 2021   BMI 30.82 kg/m²     FHT: Present 130s  BPM   Uterine Size: Growth 49%        Assessment    1) pregnancy at 32w4d- Passed 2hr GTT. Rh +. Hgb 11.4g/dL. US IMP: VTX. Growth 49%, EFW 4.8#. [place] 15cm.  bpm. Marginal cord insertion noted. Fetal movement noted. .     2) Velamentous insertion of cord and accessory lobe of placenta- MFM rec growth US 30-34 weeks. Growth today 49%. Plan repeat growth @ 36 weeks.     3) S/P Flu vaccine    4) Tdap vaccine- Tdap vaccine given today. Disc that all pregnant women should get a Tdap shot in the third trimester, preferably between 27 weeks and 36 weeks of pregnancy. The Tdap shot is an effective and safe way to protect the baby from serious illness and complications of pertussis. Recommend that partners, family members, and infant caregivers should be up to date on theTdap vaccine if they have not previously been vaccinated. Ideally, all family members should be vaccinated at least 2 weeks before coming in contact with the . If not administered during pregnancy, the Tdap vaccine should be given immediately postpartum if the patient is not UTD on Tdap.      5) COVID 19- COVID19 precautions were reviewed with the patient. Continue to encourage social  distancing, wearing a mask, and good hand hygiene.  I wore a mask, protective eye wear, and gloves during this patient encounter.  Patient also wearing a surgical mask and social distancing was observed. Hand hygeine performed before and after seeing the patient. 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     7) Hip pain- Occurs worse when at work. Was off last week and reports improved symptoms while off of work. She is using a pregnancy support belt. Has PT ref in place.     Plan    Continue prenatal vitamins  Reviewed this stage of pregnancy  Problem list updated   Follow up in 2 weeks for OB laxmi Munoz, TAMMI  1/17/2022  10:56 EST

## 2022-01-18 NOTE — PROGRESS NOTES
OB follow up     Lisa Davenport is a 31 y.o.  32w5d being seen today for her obstetrical visit.  Patient reports no bleeding, no contractions and no leaking. Fetal movement: normal.     Review of Systems  No bleeding, No cramping/contractions     /70   Wt 73 kg (161 lb)   LMP 2021   BMI 28.52 kg/m²     FHT: present BPM   Uterine Size: 25 cm       Assessment/Plan:    1) 31 y.o.  -pregnancy at 32w5d    2)   Encounter Diagnosis   Name Primary?   • 25 weeks gestation of pregnancy Yes   2-hour GTT next visit.    3) Reviewed this stage of pregnancy  4) Problem list updated     Return in about 4 weeks (around 2021) for 2 HR GTT, OB Tummy.      Ethan Myers MD    2022  11:16 EST

## 2022-01-21 ENCOUNTER — TREATMENT (OUTPATIENT)
Dept: PHYSICAL THERAPY | Facility: CLINIC | Age: 32
End: 2022-01-21

## 2022-01-21 DIAGNOSIS — M62.81 MUSCLE WEAKNESS: ICD-10-CM

## 2022-01-21 DIAGNOSIS — M53.3 SACROILIAC JOINT DYSFUNCTION OF RIGHT SIDE: ICD-10-CM

## 2022-01-21 DIAGNOSIS — R10.2 PAIN OF PELVIC GIRDLE: Primary | ICD-10-CM

## 2022-01-21 PROCEDURE — 97530 THERAPEUTIC ACTIVITIES: CPT | Performed by: PHYSICAL THERAPIST

## 2022-01-21 PROCEDURE — 97162 PT EVAL MOD COMPLEX 30 MIN: CPT | Performed by: PHYSICAL THERAPIST

## 2022-01-21 NOTE — PROGRESS NOTES
"    Physical Therapy Initial Evaluation and Plan of Care    Patient: Lisa Davenport   : 1990  Diagnosis/ICD-10 Code:  Pain of pelvic girdle [R10.2]  Referring practitioner: TAMMI Garcia  Date of Initial Visit: 2022  Today's Date: 2022  Patient seen for 1 session    Progress Note Due: 2022     Visit Diagnoses:    ICD-10-CM ICD-9-CM   1. Pain of pelvic girdle  R10.2 [id number]   2. Sacroiliac joint dysfunction of right side  M53.3 724.6   3. Muscle weakness  M62.81 728.87         Subjective Questionnaire: Oswestry:       Subjective Evaluation    History of Present Illness  Mechanism of injury: The patient reports to the clinic 33 weeks pregnant with her second child. Her due date is 3/10/2022. She notes acute on chronic insidious onset of low back pain that began during this pregnancy and has gradually worsened. She states that the pain does occur on both sides of her back, worse on left side at the present moment. She will move and feel a sharp pain that starts in her back and will go down the leg stopping at the knee. She will feel like she \"can't move\" for a minute or two and then her back will be sore for the rest of the day. She notes new onset of incontinence with coughing and constipation. She has recently started iron supplements. She does wear a belly band at work for pain relief. Due to pain, she has difficulty with more than 10-15 minutes of standing and walking. She has difficulty with heavy lifting and her sleep is impaired due to pain. When her pain is extremely high, she has difficulty with stair navigation and rolling in bed. PMH includes 1 previous vaginal childbirth 4 years ago with perineal tear, fibromyalgia (diagnosed 13 years ago), anemia, ADD, and wisdom tooth extraction.       Patient Occupation: Retail management  Pain  Current pain ratin  At best pain ratin  At worst pain rating: 10  Location: Low back   Relieving factors: rest  Aggravating " factors: ambulation, lifting, stairs, standing, sleeping and prolonged positioning  Progression: worsening    Patient Goals  Patient goals for therapy: decreased pain, increased strength and independence with ADLs/IADLs  Patient goal: Continue working as long as possible          Objective        Special Questions  Patient is experiencing disturbed sleep.       Static Posture     Lumbar Spine   Increased lordosis.     Pelvis   Anterior pelvic tilt    Comments  R ASIS lower than L ASIS, with apparent leg length discrepancy     Postural Observations  Seated posture: fair  Standing posture: poor        Palpation   Left   No palpable tenderness to the gluteus sue, obturator externus and piriformis.   Tenderness of the gluteus medius.     Right Tenderness of the gluteus sue, gluteus medius, obturator externus and piriformis.     Tenderness     Left Hip   Tenderness in the sacroiliac joint. No tenderness in the pubic tubercle.     Right Hip   Tenderness in the sacroiliac joint. No tenderness in the pubic tubercle.     Neurological Testing     Sensation     Lumbar   Left   Intact: light touch    Right   Hypersensation: light touch    Comments   Right light touch: L2-L5    Active Range of Motion     Additional Active Range of Motion Details  Flexion: Reduced by 10%  Extension: Reduced by 75%  Right Sidebending: Reduced by 25%  Left Sidebending: Reduced by 25%  Right Rotation: WNL  Left Rotation: Reduced by 10%  *Pain present in right low back with all movements     Strength/Myotome Testing     Left Hip   Planes of Motion   Flexion: 3+  Abduction: 4-    Right Hip   Planes of Motion   Flexion: 3+  Abduction: 4    Left Knee   Flexion: 4+  Extension: 4+    Right Knee   Flexion: 4  Extension: 4    Left Ankle/Foot   Dorsiflexion: 4+    Right Ankle/Foot   Dorsiflexion: 4+    Additional Strength Details  *Pain in right low back with all strength measurements     Tests     Left Pelvic Girdle/Sacrum   Positive: sacral spring.    Negative: sacrum compression and gapping.     Right Pelvic Girdle/Sacrum   Positive: sacrum compression, gapping and sacral spring.     Left Hip   Positive Gillet's.   Negative EDUARDO and FADIR.   SLR: Negative.     Right Hip   Positive EDUARDO, MARIANNEIR, Drewlen's and Gillet's.   SLR: Negative.       Education: Results of examination, plan of care including intended interventions, spinal anatomy and hormonal changes with pregnancy, use of a proper SIJ belt, activity modifications to reduce pain    Assessment & Plan     Assessment  Impairments: abnormal or restricted ROM, activity intolerance, impaired physical strength, lacks appropriate home exercise program and pain with function  Functional Limitations: lifting, sleeping, walking, uncomfortable because of pain, moving in bed and unable to perform repetitive tasks  Assessment details: The patient is a 31 year old female that reports to the clinic at 33 weeks gestation with signs and symptoms consistent with right sacroiliac joint dysfunction. Her lower extremity strength is reduced bilaterally, as is her spinal active range of motion in most planes. Her right posterior hip muscles are guarded and painful to palpation. She has multiple positive right sided sacroiliac joint special tests. These factors are limiting her ability to walk or stand greater than 10 minutes, lift heavy objects, or sleep without restriction. When her pain is high, she also has difficulty navigating stairs or rolling in bed. Her plan of care is complicated by evolving pregnancy and diagnosis of fibromyalgia. Based on my evaluation today, he would benefit from skilled physical therapy in order to address the stated deficits and return to her previous level of function.    Prognosis: good    Goals  Plan Goals: Short Term: 2-4 week  1. The patient will be independent with modifications to sitting and walking to decrease low back pain.  2. The patient will be independent with donning/doffing  serola belt for pain relief during work tasks (lifting, walking, standing).   3. The patient will perform active lumbar spinal ROM in all planes with 1/10 pain in low back in order to improve lifting tolerance and lifting mechanics.       Long Term: 7-8 weeks  1. The patient will be independent with home exercise program to improve self management of condition.   2. The patient will perform full 6 hour work shift with <4/10 low back pain.   3. The patient will improve walking and standing tolerance to 30 minutes to complete work tasks without restriction.       Plan  Therapy options: will be seen for skilled therapy services  Planned modality interventions: cryotherapy and thermotherapy (hydrocollator packs)  Planned therapy interventions: balance/weight-bearing training, flexibility, functional ROM exercises, home exercise program, joint mobilization, manual therapy, neuromuscular re-education, postural training, soft tissue mobilization, spinal/joint mobilization, strengthening, stretching and therapeutic activities  Frequency: 1x week  Duration in weeks: 8  Treatment plan discussed with: patient        History # of Personal Factors and/or Comorbidities: MODERATE (1-2)  Examination of Body System(s): # of elements: LOW (1-2)  Clinical Presentation: EVOLVING  Clinical Decision Making: MODERATE      Timed:         Manual Therapy:         mins  12543;     Therapeutic Exercise:         mins  83795;     Neuromuscular Hal:        mins  74578;    Therapeutic Activity:   24       mins  42886;     Gait Training:           mins  29955;     Ultrasound:          mins  72308;    Ionto                                   mins   43774  Self Care                            mins   26738  Canalith Repos         mins 23920      Un-Timed:  Electrical Stimulation:         mins  07026 ( );  Dry Needling          mins self-pay  Traction          mins 90599  Low Eval          Mins  79063  Mod Eval    30      Mins  12397  High Eval                             Mins  41975        Timed Treatment:   54   mins   Total Treatment:    55   mins          PT: Nany Nash PT     License Number: [id number]  Electronically signed by Nany Nash PT, 01/21/22, 9:57 AM EST    Certification Period: 1/21/2022 thru 4/20/2022  I certify that the therapy services are furnished while this patient is under my care.  The services outlined above are required by this patient, and will be reviewed every 90 days.         Physician Signature:__________________________________________________    PHYSICIAN: Ember Munoz APRN      DATE:     Please sign and return via fax to [email or web address] . Thank you, [facility].

## 2022-01-24 DIAGNOSIS — M53.3 SACROILIAC PAIN: Primary | ICD-10-CM

## 2022-01-25 ENCOUNTER — TREATMENT (OUTPATIENT)
Dept: PHYSICAL THERAPY | Facility: CLINIC | Age: 32
End: 2022-01-25

## 2022-01-25 DIAGNOSIS — R10.2 PAIN OF PELVIC GIRDLE: Primary | ICD-10-CM

## 2022-01-25 DIAGNOSIS — M62.81 MUSCLE WEAKNESS: ICD-10-CM

## 2022-01-25 DIAGNOSIS — M53.3 SACROILIAC JOINT DYSFUNCTION OF RIGHT SIDE: ICD-10-CM

## 2022-01-25 PROCEDURE — 97112 NEUROMUSCULAR REEDUCATION: CPT | Performed by: PHYSICAL THERAPIST

## 2022-01-25 PROCEDURE — 97140 MANUAL THERAPY 1/> REGIONS: CPT | Performed by: PHYSICAL THERAPIST

## 2022-01-25 NOTE — PROGRESS NOTES
Physical Therapy Daily Progress Note      Patient: Lisa Davenport   : 1990  Referring practitioner: TAMMI Garcia  Date of Initial Visit: Type: THERAPY  Noted: 2022  Today's Date: 2022  Patient seen for 2 sessions    Progress Note Due: 2022     Lisa Davenport reports: that she was sore after last session.       Objective/ Treatment:  Soft tissue mobilization performed to right posterior hip and paraspinals for pain relief, along with low grade R SIJ mobilizations for pain relief and generalized lumbosacral gapping to improve spinal mobility. The right posterior hip, particularly the glute max origin, is tender to palpation. Utilized KT taping of the R SIJ for proper support during therapeutic exercises. The patient was able to perform all exercises today with good form, requiring demo and verbal cues ~50% of the time. Utilized moist heat following session for pain management without any adverse reaction.     Education: Initiated home exercise program, provided education on use of SIJ taping, meditations for pelvic health/improving connection to pelvic floor prior to birth, functional TrA with activity to reduce pain         Assessment: The patient arrived to today's session 17 minutes late, resulting in a shortened session. Utilized manual interventions for pain relief and followed with spinal mobility exercises, glute and abdominal activation for lumbopelvic support. Plan to progress lumbopelvic stabilization exercises next session with more concentric strengthening.          Plan:  Progress per Plan of Care and Progress strengthening /stabilization /functional activity             Timed:         Manual Therapy:   9      mins  38206;     Therapeutic Exercise:    5     mins  81402;     Neuromuscular Hal:  13      mins  94737;    Therapeutic Activity:    4      mins  19609;     Gait Training:           mins  61668;     Ultrasound:          mins  09426;    Ionto                                    mins   60388  Self Care                            mins   17317      Un-Timed:  Electrical Stimulation:         mins  24695 ( );  Dry Needling          mins self-pay  Traction          mins 87834  Low Eval          Mins  77877  Mod Eval          Mins  49323  High Eval                            Mins  47520  Canalith Repos                   mins  36001    Timed Treatment:  31    mins   Total Treatment:    46    mins    Nany Nash, PT  Physical Therapist

## 2022-01-28 LAB
EXTERNAL CYSTIC FIBROSIS: NORMAL
EXTERNAL NIPT: NORMAL

## 2022-01-31 ENCOUNTER — ROUTINE PRENATAL (OUTPATIENT)
Dept: OBSTETRICS AND GYNECOLOGY | Facility: CLINIC | Age: 32
End: 2022-01-31

## 2022-01-31 VITALS — WEIGHT: 173.4 LBS | DIASTOLIC BLOOD PRESSURE: 72 MMHG | BODY MASS INDEX: 30.72 KG/M2 | SYSTOLIC BLOOD PRESSURE: 118 MMHG

## 2022-01-31 DIAGNOSIS — D64.9 ANEMIA, UNSPECIFIED TYPE: ICD-10-CM

## 2022-01-31 DIAGNOSIS — R41.840 ATTENTION OR CONCENTRATION DEFICIT: ICD-10-CM

## 2022-01-31 DIAGNOSIS — O43.199 MARGINAL INSERTION OF UMBILICAL CORD AFFECTING MANAGEMENT OF MOTHER: ICD-10-CM

## 2022-01-31 DIAGNOSIS — Z34.90 PREGNANCY, UNSPECIFIED GESTATIONAL AGE: ICD-10-CM

## 2022-01-31 DIAGNOSIS — Z14.8 GENETIC CARRIER OF OTHER DISEASE: ICD-10-CM

## 2022-01-31 DIAGNOSIS — Z84.89 FAMILY HISTORY OF GENETIC DISEASE: ICD-10-CM

## 2022-01-31 DIAGNOSIS — Z34.93 PRENATAL CARE IN THIRD TRIMESTER: Primary | ICD-10-CM

## 2022-01-31 DIAGNOSIS — M25.559 HIP PAIN: ICD-10-CM

## 2022-01-31 LAB
GLUCOSE UR STRIP-MCNC: NEGATIVE MG/DL
PROT UR STRIP-MCNC: NEGATIVE MG/DL

## 2022-01-31 PROCEDURE — 0502F SUBSEQUENT PRENATAL CARE: CPT | Performed by: OBSTETRICS & GYNECOLOGY

## 2022-01-31 NOTE — PROGRESS NOTES
OB follow up     Lisa Davenport is a 31 y.o.  34w4d being seen today for her obstetrical visit.  Patient reports no complaints. Fetal movement: normal. She is doing PT fr her hip pain and it is helping. She is planning on an IUD pp. She is not taking Adderrall .    Review of Systems  No bleeding, No cramping/contractions     /72   Wt 78.7 kg (173 lb 6.4 oz)   LMP 2021   BMI 30.72 kg/m²     FHT: 140 BPM   Uterine Size: 34  cm       Assessment/Plan:    1) 31 y.o.  -pregnancy at 34w4d- progressing well.     2) Velementous cord insertion and accessory lobe- US growth 2 weeks ago normal, repeat growth in 2 weeks at 36 weeks.     3) Anemia- HgB 11.4 ( 2021)- on daily iron. 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  Social distancing was observed as appropriate.    11) Plans IUD pp    12)Reviewed this stage of pregnancy    13)Problem list updated     14) RTO 2 weeks OB int, GBS, CBC and US growth ( velamentous cord insertion)      Moriah Hardy MD    2022  12:02 EST

## 2022-02-01 LAB
C TRACH RRNA SPEC QL NAA+PROBE: NEGATIVE
N GONORRHOEA RRNA SPEC QL NAA+PROBE: NEGATIVE
T VAGINALIS DNA SPEC QL NAA+PROBE: NEGATIVE

## 2022-02-09 ENCOUNTER — TELEPHONE (OUTPATIENT)
Dept: OBSTETRICS AND GYNECOLOGY | Facility: CLINIC | Age: 32
End: 2022-02-09

## 2022-02-10 NOTE — TELEPHONE ENCOUNTER
Stomach pain from contractions or due to diarrhea?  She needs to use the brat diet which is bananas, rice, applesauce and toast.  She cannot keep anything down for more than 24 hours [facility]  If she thinks she is mike she should see be seen prior to that.  Does she have any medication at home to help with her vomiting?

## 2022-02-11 ENCOUNTER — TREATMENT (OUTPATIENT)
Dept: PHYSICAL THERAPY | Facility: CLINIC | Age: 32
End: 2022-02-11

## 2022-02-11 DIAGNOSIS — M53.3 SACROILIAC JOINT DYSFUNCTION OF RIGHT SIDE: ICD-10-CM

## 2022-02-11 DIAGNOSIS — M62.81 MUSCLE WEAKNESS: ICD-10-CM

## 2022-02-11 DIAGNOSIS — R10.2 PAIN OF PELVIC GIRDLE: Primary | ICD-10-CM

## 2022-02-11 PROCEDURE — 97110 THERAPEUTIC EXERCISES: CPT | Performed by: PHYSICAL THERAPIST

## 2022-02-11 PROCEDURE — 97140 MANUAL THERAPY 1/> REGIONS: CPT | Performed by: PHYSICAL THERAPIST

## 2022-02-11 NOTE — PROGRESS NOTES
Physical Therapy Daily Progress Note      Patient: Lisa Davenport   : 1990  Referring practitioner: TAMMI Garcia  Date of Initial Visit: Type: THERAPY  Noted: 2022  Today's Date: 2022  Patient seen for 3 sessions    Progress Note Due: 2022     Lisa Davenport reports: that she is 36 weeks now. Is now getting over a stomach virus, where she had to lay in bed a lot. Felt that laying around really hurt her right side.       Objective/ Treatment: The patient exhibits tenderness of the right glute max near the right SIJ, as well as of the right sided erector spinae. The erector spinae are over active. She is able to perform all exercises with verbal cues ~25% of the time for correct form and perform all exercises in pain minimized range. She denies an increase in pain during exercises today. Moist heat applied to the sacral spine following session for pain relief of the pelvic girdle. No adverse reaction noted. See manual therapy and exercise logs for more details.     Education: Updated HEP      Assessment: The patient exhibits increased muscle tension of right sided erector spinae and tenderness of the right glute medius and sue. Recent illness and sedentary behavior has likely contributed. Progressed thoracic mobility today without an increase and pain and completed today's session with use of moist heat to the sacrum for pain relief.         Plan:  Progress strengthening /stabilization /functional activity             Timed:         Manual Therapy:  15       mins  93112;     Therapeutic Exercise:  15       mins  61524;     Neuromuscular Hal:        mins  81530;    Therapeutic Activity:          mins  34863;     Gait Training:           mins  20493;     Ultrasound:          mins  21353;    Ionto                                   mins   92978  Self Care                            mins   54616      Un-Timed:  Electrical Stimulation:         mins  93120 ( );  Dry  Needling          mins self-pay  Traction          mins 13542  Low Eval          Mins  90437  Mod Eval          Mins  54371  High Eval                            Mins  56253  Canalith Repos                   mins  72874    Timed Treatment:  30    mins   Total Treatment:    45    mins    Nany Nash, PT  Physical Therapist

## 2022-02-15 ENCOUNTER — ROUTINE PRENATAL (OUTPATIENT)
Dept: OBSTETRICS AND GYNECOLOGY | Facility: CLINIC | Age: 32
End: 2022-02-15

## 2022-02-15 VITALS — BODY MASS INDEX: 30.29 KG/M2 | DIASTOLIC BLOOD PRESSURE: 72 MMHG | WEIGHT: 171 LBS | SYSTOLIC BLOOD PRESSURE: 120 MMHG

## 2022-02-15 DIAGNOSIS — Z36.85 ENCOUNTER FOR ANTENATAL SCREENING FOR STREPTOCOCCUS B: Primary | ICD-10-CM

## 2022-02-15 LAB
GLUCOSE UR STRIP-MCNC: NEGATIVE MG/DL
PROT UR STRIP-MCNC: ABNORMAL MG/DL

## 2022-02-15 PROCEDURE — 0502F SUBSEQUENT PRENATAL CARE: CPT | Performed by: OBSTETRICS & GYNECOLOGY

## 2022-02-15 NOTE — PROGRESS NOTES
Pt is a 31 y.o. @36w5d.  Flu, covid, Tdap.  I saw the patient with a face mask, gloves and eye protection  The patient herself was masked.  Social distancing was observed as appropriate. All COVID precautions observed.   Labor warnings reviewed.   GBS done.

## 2022-02-17 LAB — GP B STREP DNA SPEC QL NAA+PROBE: NEGATIVE

## 2022-02-18 ENCOUNTER — TREATMENT (OUTPATIENT)
Dept: PHYSICAL THERAPY | Facility: CLINIC | Age: 32
End: 2022-02-18

## 2022-02-18 DIAGNOSIS — M53.3 SACROILIAC JOINT DYSFUNCTION OF RIGHT SIDE: ICD-10-CM

## 2022-02-18 DIAGNOSIS — R10.2 PAIN OF PELVIC GIRDLE: Primary | ICD-10-CM

## 2022-02-18 DIAGNOSIS — M62.81 MUSCLE WEAKNESS: ICD-10-CM

## 2022-02-18 PROCEDURE — 97110 THERAPEUTIC EXERCISES: CPT | Performed by: PHYSICAL THERAPIST

## 2022-02-18 PROCEDURE — 97140 MANUAL THERAPY 1/> REGIONS: CPT | Performed by: PHYSICAL THERAPIST

## 2022-02-18 NOTE — PROGRESS NOTES
Physical Therapy Daily Progress Note      Patient: Lisa Davenport   : 1990  Referring practitioner: TAMMI Garcia  Date of Initial Visit: Type: THERAPY  Noted: 2022  Today's Date: 2022  Patient seen for 4 sessions    Progress Note Due: 2022     Lisa Davenport reports: that she is 37 weeks today. Having more lower pelvic pain. Possible induction scheduled at 39 weeks. Feels like keeping the legs and feet together has been so helpful      Subjective Questionnaire: Oswestry:     Pain  Current pain ratin  At best pain ratin  At worst pain ratin  Location: Low back       Objective/ Treatment:  Lumbar Spine   Increased lordosis.      Pelvis   Anterior pelvic tilt     Postural Observations  Seated posture: fair  Standing posture: fair       Palpation   Left   No palpable tenderness to the gluteus sue, obturator externus and piriformis.   Tenderness of the gluteus medius.      Right Tenderness of the gluteus sue*, gluteus medius*, obturator externus, quadratus femoris* and piriformis.      Tenderness       Right Hip   Slight tenderness in the sacroiliac joint. No tenderness in the pubic tubercle.      Active Range of Motion     Additional Active Range of Motion Details  Flexion: Reduced by 10%  Extension: WNL   Right Sidebending: Reduced by 10%  Left Sidebending: WNL  Right Rotation: WNL  Left Rotation: Reduced by 10%  *All movements are non painful      Strength/Myotome Testing      Left Hip   Planes of Motion   Flexion: 4  Abduction: 4-     Right Hip   Planes of Motion   Flexion: 4  Abduction: 4     Left Knee   Flexion: 5  Extension: 5     Right Knee   Flexion: 4+  Extension: 5     Left Ankle/Foot   Dorsiflexion: 4+     Right Ankle/Foot   Dorsiflexion: 5      Neurological Testing      Sensation      Lumbar   Left   Intact: light touch     Right   Hypersensation: light touch     Comments   Right light touch: L3-4     Education: Progress towards goals      Goals  Plan Goals: Short Term: 2-4 week  1. The patient will be independent with modifications to sitting and walking to decrease low back pain. -achieved   2. The patient will be independent with donning/doffing serola belt for pain relief during work tasks (lifting, walking, standing). -abandoned, patient did not purchase serola   3. The patient will perform active lumbar spinal ROM in all planes with 1/10 pain in low back in order to improve lifting tolerance and lifting mechanics. -achieved       Long Term: 7-8 weeks  1. The patient will be independent with home exercise program to improve self management of condition. -progressing   2. The patient will perform full 6 hour work shift with <4/10 low back pain. -progressing   3. The patient will improve walking and standing tolerance to 30 minutes to complete work tasks without restriction. -progressing, standing 20 min, walking 2 hours       Assessment: The patient has attended 4 sessions of skilled physical therapy thus far. Her compliance with attendance and a home exercise program have been fair. She has missed several sessions due to inclement weather and childcare issues. Upon assessment today, she exhibits improvements in spinal range of motion and knee/ankle strength. She notes decreased low back pain with activities such as rolling in bed and getting out of the car, and walking. She continues to exhibit tenderness of the right posterior hip and sacroiliac joint, along with weakness of the hip muscles bilaterally, limiting her ability to stand for more than 20 minutes or get off the floor. Her plan of care is complicated by ongoing pregnancy, including mechanical and hormonal changes. Based on my re-assessment today, she would benefit from additional skilled physical therapy in order to address the stated deficits and return to her previous level of function.          Plan:  Progress strengthening /stabilization /functional activity, 1x/week for  additional 1-3 weeks              Timed:         Manual Therapy:  15       mins  38133;     Therapeutic Exercise:  24      mins  43534;     Neuromuscular Hal:        mins  74023;    Therapeutic Activity:    4      mins  10600;     Gait Training:           mins  28008;     Ultrasound:          mins  59113;    Ionto                                   mins   36915  Self Care                            mins   96915      Un-Timed:  Electrical Stimulation:         mins  85943 ( );  Dry Needling          mins self-pay  Traction          mins 76378  Low Eval          Mins  71808  Mod Eval          Mins  81161  High Eval                            Mins  41004  Canalith Repos                   mins  89676    Timed Treatment:  43    mins   Total Treatment:    58    mins    Nany Nash, PT  Physical Therapist

## 2022-02-22 ENCOUNTER — ROUTINE PRENATAL (OUTPATIENT)
Dept: OBSTETRICS AND GYNECOLOGY | Facility: CLINIC | Age: 32
End: 2022-02-22

## 2022-02-22 VITALS — BODY MASS INDEX: 31.35 KG/M2 | WEIGHT: 177 LBS | DIASTOLIC BLOOD PRESSURE: 76 MMHG | SYSTOLIC BLOOD PRESSURE: 106 MMHG

## 2022-02-22 DIAGNOSIS — D64.9 ANEMIA, UNSPECIFIED TYPE: ICD-10-CM

## 2022-02-22 DIAGNOSIS — O43.199 MARGINAL INSERTION OF UMBILICAL CORD AFFECTING MANAGEMENT OF MOTHER: ICD-10-CM

## 2022-02-22 DIAGNOSIS — Z34.93 PRENATAL CARE IN THIRD TRIMESTER: Primary | ICD-10-CM

## 2022-02-22 LAB
GLUCOSE UR STRIP-MCNC: NEGATIVE MG/DL
PROT UR STRIP-MCNC: NEGATIVE MG/DL

## 2022-02-22 PROCEDURE — 0502F SUBSEQUENT PRENATAL CARE: CPT | Performed by: OBSTETRICS & GYNECOLOGY

## 2022-02-22 NOTE — PROGRESS NOTES
OB follow up     Lisa Davenport is a 31 y.o.  37w5d being seen today for her obstetrical visit.  Patient reports no bleeding, no contractions and no leaking. Fetal movement: normal.  Prenatal care complicated by anemia.  Taking iron daily.  Also has a marginal insertion of umbilical cord and accessory lobe placenta.  No bleeding noted.    Review of Systems  No bleeding, No cramping/contractions     /76   Wt 80.3 kg (177 lb)   LMP 2021   BMI 31.35 kg/m²     FHT: present BPM   Uterine Size: 37 cm       Assessment/Plan:    1) 31 y.o.  -pregnancy at 37w5d    2)   Encounter Diagnoses   Name Primary?   • Prenatal care in third trimester Yes   • Anemia, unspecified type    • Marginal insertion of umbilical cord and accessory lobe of placenta    GBS negative.  Labor warnings given.  Follow-up 1 week.    3) Reviewed this stage of pregnancy  4) Problem list updated     Return in about 1 week (around 3/1/2022) for OB INT.      Ethan Myers MD    2022  16:27 EST

## 2022-03-02 ENCOUNTER — PREP FOR SURGERY (OUTPATIENT)
Dept: OTHER | Facility: HOSPITAL | Age: 32
End: 2022-03-02

## 2022-03-02 ENCOUNTER — ROUTINE PRENATAL (OUTPATIENT)
Dept: OBSTETRICS AND GYNECOLOGY | Facility: CLINIC | Age: 32
End: 2022-03-02

## 2022-03-02 VITALS — DIASTOLIC BLOOD PRESSURE: 60 MMHG | WEIGHT: 177 LBS | SYSTOLIC BLOOD PRESSURE: 102 MMHG | BODY MASS INDEX: 31.35 KG/M2

## 2022-03-02 DIAGNOSIS — O43.199 MARGINAL INSERTION OF UMBILICAL CORD AFFECTING MANAGEMENT OF MOTHER: ICD-10-CM

## 2022-03-02 DIAGNOSIS — Z34.93 PRENATAL CARE IN THIRD TRIMESTER: Primary | ICD-10-CM

## 2022-03-02 LAB
GLUCOSE UR STRIP-MCNC: NEGATIVE MG/DL
PROT UR STRIP-MCNC: NEGATIVE MG/DL

## 2022-03-02 PROCEDURE — 0502F SUBSEQUENT PRENATAL CARE: CPT | Performed by: OBSTETRICS & GYNECOLOGY

## 2022-03-02 RX ORDER — MISOPROSTOL 100 UG/1
800 TABLET ORAL AS NEEDED
Status: CANCELLED | OUTPATIENT
Start: 2022-03-02

## 2022-03-02 RX ORDER — SODIUM CHLORIDE 0.9 % (FLUSH) 0.9 %
1-10 SYRINGE (ML) INJECTION AS NEEDED
Status: CANCELLED | OUTPATIENT
Start: 2022-03-02

## 2022-03-02 RX ORDER — CARBOPROST TROMETHAMINE 250 UG/ML
250 INJECTION, SOLUTION INTRAMUSCULAR AS NEEDED
Status: CANCELLED | OUTPATIENT
Start: 2022-03-02

## 2022-03-02 RX ORDER — LIDOCAINE HYDROCHLORIDE 10 MG/ML
5 INJECTION, SOLUTION EPIDURAL; INFILTRATION; INTRACAUDAL; PERINEURAL AS NEEDED
Status: CANCELLED | OUTPATIENT
Start: 2022-03-02

## 2022-03-02 RX ORDER — SODIUM CHLORIDE 0.9 % (FLUSH) 0.9 %
10 SYRINGE (ML) INJECTION EVERY 12 HOURS SCHEDULED
Status: CANCELLED | OUTPATIENT
Start: 2022-03-02

## 2022-03-02 RX ORDER — METHYLERGONOVINE MALEATE 0.2 MG/ML
200 INJECTION INTRAVENOUS ONCE AS NEEDED
Status: CANCELLED | OUTPATIENT
Start: 2022-03-02

## 2022-03-02 NOTE — PROGRESS NOTES
OB follow up     Lisa Davenport is a 31 y.o.  38w6d being seen today for her obstetrical visit.  Patient reports no bleeding, no leaking and occasional contractions. Fetal movement: normal.     Review of Systems  No bleeding, No cramping/contractions     /60   Wt 80.3 kg (177 lb)   LMP 2021   BMI 31.35 kg/m²     FHT: present BPM   Uterine Size: 38 cm   I looked back over the MFM consult and they recommended growth every 4 weeks.  I explained velamentous insertion accessory lobes to the patient in detail including drawings.  We discussed the risks of fetal hemorrhage should amniotomy cross the fetal veins in the amniotic sac.  She understands that oftentimes we find velamentous insertions after delivery and outcomes are good.  However we know about this velamentous insertion ahead of time and I explained what might happen should amniotomy occur across fetal vessels.  We discussed the risks of her primary  and possible future  should she become pregnant in the future.  We had a long conversation about the pros and cons of induction versus primary  and the patient has opted for primary .  This was discussed with my partner Dr. Charles ahead of time who concurs with offering the patient these options.    Assessment/Plan:    1) 31 y.o.  -pregnancy at 38w6d    2)   Encounter Diagnoses   Name Primary?   • Prenatal care in third trimester    • Marginal insertion of umbilical cord and accessory lobe of placenta Yes   N.p.o. after midnight.  Plan  tomorrow at 39-0/7 weeks.    3) Reviewed this stage of pregnancy  4) Problem list updated     Return for Primary low transverse  section tomorrow.      Ethan Myers MD    3/2/2022  13:54 EST

## 2022-03-03 ENCOUNTER — HOSPITAL ENCOUNTER (INPATIENT)
Facility: HOSPITAL | Age: 32
LOS: 2 days | Discharge: HOME | End: 2022-03-05
Attending: OBSTETRICS & GYNECOLOGY | Admitting: OBSTETRICS & GYNECOLOGY

## 2022-03-03 ENCOUNTER — ANESTHESIA EVENT (OUTPATIENT)
Dept: OBSTETRICS AND GYNECOLOGY | Facility: HOSPITAL | Age: 32
End: 2022-03-03

## 2022-03-03 ENCOUNTER — ANESTHESIA (OUTPATIENT)
Dept: OBSTETRICS AND GYNECOLOGY | Facility: HOSPITAL | Age: 32
End: 2022-03-03

## 2022-03-03 DIAGNOSIS — Z98.891 S/P CESAREAN SECTION: Primary | ICD-10-CM

## 2022-03-03 DIAGNOSIS — Z34.93 PRENATAL CARE IN THIRD TRIMESTER: ICD-10-CM

## 2022-03-03 DIAGNOSIS — O43.199 MARGINAL INSERTION OF UMBILICAL CORD AFFECTING MANAGEMENT OF MOTHER: ICD-10-CM

## 2022-03-03 PROBLEM — O43.123 VELAMENTOUS INSERTION OF UMBILICAL CORD IN THIRD TRIMESTER: Status: ACTIVE | Noted: 2022-03-03

## 2022-03-03 LAB
ABO GROUP BLD: NORMAL
AMPHET+METHAMPHET UR QL: NEGATIVE
AMPHETAMINES UR QL: NEGATIVE
BACTERIA UR QL AUTO: ABNORMAL /HPF
BARBITURATES UR QL SCN: NEGATIVE
BENZODIAZ UR QL SCN: NEGATIVE
BILIRUB UR QL STRIP: NEGATIVE
BLD GP AB SCN SERPL QL: NEGATIVE
BUPRENORPHINE SERPL-MCNC: NEGATIVE NG/ML
CANNABINOIDS SERPL QL: NEGATIVE
CLARITY UR: CLEAR
COCAINE UR QL: NEGATIVE
COLOR UR: YELLOW
DEPRECATED RDW RBC AUTO: 47.6 FL (ref 37–54)
ERYTHROCYTE [DISTWIDTH] IN BLOOD BY AUTOMATED COUNT: 13.6 % (ref 12.3–15.4)
GLUCOSE UR STRIP-MCNC: NEGATIVE MG/DL
HCT VFR BLD AUTO: 34.6 % (ref 34–46.6)
HGB BLD-MCNC: 11.5 G/DL (ref 12–15.9)
HGB UR QL STRIP.AUTO: ABNORMAL
HYALINE CASTS UR QL AUTO: ABNORMAL /LPF
KETONES UR QL STRIP: NEGATIVE
LEUKOCYTE ESTERASE UR QL STRIP.AUTO: NEGATIVE
MCH RBC QN AUTO: 31.7 PG (ref 26.6–33)
MCHC RBC AUTO-ENTMCNC: 33.2 G/DL (ref 31.5–35.7)
MCV RBC AUTO: 95.3 FL (ref 79–97)
METHADONE UR QL SCN: NEGATIVE
NITRITE UR QL STRIP: NEGATIVE
OPIATES UR QL: NEGATIVE
OXYCODONE UR QL SCN: NEGATIVE
PCP UR QL SCN: NEGATIVE
PH UR STRIP.AUTO: 7 [PH] (ref 4.5–8)
PLATELET # BLD AUTO: 196 10*3/MM3 (ref 140–450)
PMV BLD AUTO: 11.4 FL (ref 6–12)
PROPOXYPH UR QL: NEGATIVE
PROT UR QL STRIP: NEGATIVE
RBC # BLD AUTO: 3.63 10*6/MM3 (ref 3.77–5.28)
RBC # UR STRIP: ABNORMAL /HPF
REF LAB TEST METHOD: ABNORMAL
RH BLD: POSITIVE
SARS-COV-2 RNA PNL SPEC NAA+PROBE: NOT DETECTED
SP GR UR STRIP: 1.02 (ref 1–1.03)
SQUAMOUS #/AREA URNS HPF: ABNORMAL /HPF
T&S EXPIRATION DATE: NORMAL
TRICYCLICS UR QL SCN: NEGATIVE
UROBILINOGEN UR QL STRIP: ABNORMAL
WBC # UR STRIP: ABNORMAL /HPF
WBC NRBC COR # BLD: 8.52 10*3/MM3 (ref 3.4–10.8)

## 2022-03-03 PROCEDURE — 63710000001 ONDANSETRON PER 8 MG: Performed by: OBSTETRICS & GYNECOLOGY

## 2022-03-03 PROCEDURE — 94799 UNLISTED PULMONARY SVC/PX: CPT

## 2022-03-03 PROCEDURE — 87635 SARS-COV-2 COVID-19 AMP PRB: CPT | Performed by: OBSTETRICS & GYNECOLOGY

## 2022-03-03 PROCEDURE — 88307 TISSUE EXAM BY PATHOLOGIST: CPT

## 2022-03-03 PROCEDURE — 25010000002 FENTANYL CITRATE (PF) 50 MCG/ML SOLUTION: Performed by: NURSE ANESTHETIST, CERTIFIED REGISTERED

## 2022-03-03 PROCEDURE — 25010000002 PHENYLEPHRINE 10 MG/ML SOLUTION: Performed by: NURSE ANESTHETIST, CERTIFIED REGISTERED

## 2022-03-03 PROCEDURE — 0 CEFAZOLIN SODIUM-DEXTROSE 2-3 GM-%(50ML) RECONSTITUTED SOLUTION: Performed by: OBSTETRICS & GYNECOLOGY

## 2022-03-03 PROCEDURE — 86850 RBC ANTIBODY SCREEN: CPT | Performed by: OBSTETRICS & GYNECOLOGY

## 2022-03-03 PROCEDURE — 86900 BLOOD TYPING SEROLOGIC ABO: CPT | Performed by: OBSTETRICS & GYNECOLOGY

## 2022-03-03 PROCEDURE — 59514 CESAREAN DELIVERY ONLY: CPT | Performed by: SPECIALIST/TECHNOLOGIST, OTHER

## 2022-03-03 PROCEDURE — 86901 BLOOD TYPING SEROLOGIC RH(D): CPT | Performed by: OBSTETRICS & GYNECOLOGY

## 2022-03-03 PROCEDURE — 25010000002 MORPHINE PER 10 MG: Performed by: NURSE ANESTHETIST, CERTIFIED REGISTERED

## 2022-03-03 PROCEDURE — 85027 COMPLETE CBC AUTOMATED: CPT | Performed by: OBSTETRICS & GYNECOLOGY

## 2022-03-03 PROCEDURE — 81001 URINALYSIS AUTO W/SCOPE: CPT | Performed by: OBSTETRICS & GYNECOLOGY

## 2022-03-03 PROCEDURE — 25010000002 KETOROLAC TROMETHAMINE PER 15 MG: Performed by: NURSE ANESTHETIST, CERTIFIED REGISTERED

## 2022-03-03 PROCEDURE — 80306 DRUG TEST PRSMV INSTRMNT: CPT | Performed by: OBSTETRICS & GYNECOLOGY

## 2022-03-03 PROCEDURE — 59510 CESAREAN DELIVERY: CPT | Performed by: OBSTETRICS & GYNECOLOGY

## 2022-03-03 PROCEDURE — 25010000002 ONDANSETRON PER 1 MG: Performed by: NURSE ANESTHETIST, CERTIFIED REGISTERED

## 2022-03-03 PROCEDURE — 25010000002 KETOROLAC TROMETHAMINE PER 15 MG: Performed by: OBSTETRICS & GYNECOLOGY

## 2022-03-03 RX ORDER — OXYTOCIN/0.9 % SODIUM CHLORIDE 30/500 ML
85 PLASTIC BAG, INJECTION (ML) INTRAVENOUS ONCE
Status: COMPLETED | OUTPATIENT
Start: 2022-03-03 | End: 2022-03-03

## 2022-03-03 RX ORDER — METHYLERGONOVINE MALEATE 0.2 MG/ML
200 INJECTION INTRAVENOUS ONCE AS NEEDED
Status: DISCONTINUED | OUTPATIENT
Start: 2022-03-03 | End: 2022-03-03

## 2022-03-03 RX ORDER — HYDROCODONE BITARTRATE AND ACETAMINOPHEN 5; 325 MG/1; MG/1
1 TABLET ORAL EVERY 4 HOURS PRN
Status: DISPENSED | OUTPATIENT
Start: 2022-03-03 | End: 2022-03-04

## 2022-03-03 RX ORDER — SODIUM CHLORIDE, SODIUM LACTATE, POTASSIUM CHLORIDE, CALCIUM CHLORIDE 600; 310; 30; 20 MG/100ML; MG/100ML; MG/100ML; MG/100ML
125 INJECTION, SOLUTION INTRAVENOUS CONTINUOUS
Status: DISCONTINUED | OUTPATIENT
Start: 2022-03-03 | End: 2022-03-05 | Stop reason: HOSPADM

## 2022-03-03 RX ORDER — PROMETHAZINE HYDROCHLORIDE 25 MG/1
25 TABLET ORAL EVERY 6 HOURS PRN
Status: DISCONTINUED | OUTPATIENT
Start: 2022-03-03 | End: 2022-03-05 | Stop reason: HOSPADM

## 2022-03-03 RX ORDER — DIPHENHYDRAMINE HYDROCHLORIDE 50 MG/ML
25 INJECTION INTRAMUSCULAR; INTRAVENOUS EVERY 4 HOURS PRN
Status: DISCONTINUED | OUTPATIENT
Start: 2022-03-03 | End: 2022-03-05 | Stop reason: HOSPADM

## 2022-03-03 RX ORDER — OXYTOCIN/0.9 % SODIUM CHLORIDE 30/500 ML
85 PLASTIC BAG, INJECTION (ML) INTRAVENOUS ONCE
Status: DISCONTINUED | OUTPATIENT
Start: 2022-03-03 | End: 2022-03-05 | Stop reason: HOSPADM

## 2022-03-03 RX ORDER — HYDROCODONE BITARTRATE AND ACETAMINOPHEN 7.5; 325 MG/1; MG/1
1 TABLET ORAL EVERY 4 HOURS PRN
Status: ACTIVE | OUTPATIENT
Start: 2022-03-03 | End: 2022-03-04

## 2022-03-03 RX ORDER — HYDROMORPHONE HCL 110MG/55ML
0.5 PATIENT CONTROLLED ANALGESIA SYRINGE INTRAVENOUS
Status: ACTIVE | OUTPATIENT
Start: 2022-03-03 | End: 2022-03-04

## 2022-03-03 RX ORDER — DIPHENHYDRAMINE HCL 25 MG
25 CAPSULE ORAL EVERY 4 HOURS PRN
Status: DISCONTINUED | OUTPATIENT
Start: 2022-03-03 | End: 2022-03-05 | Stop reason: HOSPADM

## 2022-03-03 RX ORDER — FENTANYL CITRATE 50 UG/ML
INJECTION, SOLUTION INTRAMUSCULAR; INTRAVENOUS AS NEEDED
Status: DISCONTINUED | OUTPATIENT
Start: 2022-03-03 | End: 2022-03-03 | Stop reason: SURG

## 2022-03-03 RX ORDER — OXYTOCIN/0.9 % SODIUM CHLORIDE 30/500 ML
PLASTIC BAG, INJECTION (ML) INTRAVENOUS
Status: COMPLETED
Start: 2022-03-03 | End: 2022-03-03

## 2022-03-03 RX ORDER — FAMOTIDINE 10 MG/ML
INJECTION, SOLUTION INTRAVENOUS AS NEEDED
Status: DISCONTINUED | OUTPATIENT
Start: 2022-03-03 | End: 2022-03-03 | Stop reason: SURG

## 2022-03-03 RX ORDER — BUPIVACAINE HYDROCHLORIDE 7.5 MG/ML
INJECTION, SOLUTION EPIDURAL; RETROBULBAR
Status: COMPLETED | OUTPATIENT
Start: 2022-03-03 | End: 2022-03-03

## 2022-03-03 RX ORDER — PROMETHAZINE HYDROCHLORIDE 25 MG/1
12.5 SUPPOSITORY RECTAL EVERY 6 HOURS PRN
Status: DISCONTINUED | OUTPATIENT
Start: 2022-03-03 | End: 2022-03-05 | Stop reason: HOSPADM

## 2022-03-03 RX ORDER — CARBOPROST TROMETHAMINE 250 UG/ML
250 INJECTION, SOLUTION INTRAMUSCULAR AS NEEDED
Status: DISCONTINUED | OUTPATIENT
Start: 2022-03-03 | End: 2022-03-03

## 2022-03-03 RX ORDER — CEFAZOLIN SODIUM 2 G/50ML
2 SOLUTION INTRAVENOUS ONCE
Status: COMPLETED | OUTPATIENT
Start: 2022-03-03 | End: 2022-03-03

## 2022-03-03 RX ORDER — MORPHINE SULFATE 1 MG/ML
INJECTION, SOLUTION EPIDURAL; INTRATHECAL; INTRAVENOUS AS NEEDED
Status: DISCONTINUED | OUTPATIENT
Start: 2022-03-03 | End: 2022-03-03 | Stop reason: SURG

## 2022-03-03 RX ORDER — KETOROLAC TROMETHAMINE 30 MG/ML
30 INJECTION, SOLUTION INTRAMUSCULAR; INTRAVENOUS EVERY 6 HOURS
Status: COMPLETED | OUTPATIENT
Start: 2022-03-03 | End: 2022-03-04

## 2022-03-03 RX ORDER — SODIUM CHLORIDE 0.9 % (FLUSH) 0.9 %
10 SYRINGE (ML) INJECTION EVERY 12 HOURS SCHEDULED
Status: DISCONTINUED | OUTPATIENT
Start: 2022-03-03 | End: 2022-03-03

## 2022-03-03 RX ORDER — HYDROCODONE BITARTRATE AND ACETAMINOPHEN 5; 325 MG/1; MG/1
2 TABLET ORAL EVERY 4 HOURS PRN
Status: DISCONTINUED | OUTPATIENT
Start: 2022-03-04 | End: 2022-03-05 | Stop reason: HOSPADM

## 2022-03-03 RX ORDER — ONDANSETRON 2 MG/ML
4 INJECTION INTRAMUSCULAR; INTRAVENOUS ONCE AS NEEDED
Status: ACTIVE | OUTPATIENT
Start: 2022-03-03 | End: 2022-03-04

## 2022-03-03 RX ORDER — ONDANSETRON 4 MG/1
4 TABLET, FILM COATED ORAL EVERY 6 HOURS PRN
Status: DISCONTINUED | OUTPATIENT
Start: 2022-03-03 | End: 2022-03-05 | Stop reason: HOSPADM

## 2022-03-03 RX ORDER — LIDOCAINE HYDROCHLORIDE 10 MG/ML
5 INJECTION, SOLUTION EPIDURAL; INFILTRATION; INTRACAUDAL; PERINEURAL AS NEEDED
Status: DISCONTINUED | OUTPATIENT
Start: 2022-03-03 | End: 2022-03-03

## 2022-03-03 RX ORDER — MISOPROSTOL 200 UG/1
800 TABLET ORAL AS NEEDED
Status: DISCONTINUED | OUTPATIENT
Start: 2022-03-03 | End: 2022-03-03

## 2022-03-03 RX ORDER — OXYTOCIN/0.9 % SODIUM CHLORIDE 30/500 ML
650 PLASTIC BAG, INJECTION (ML) INTRAVENOUS ONCE
Status: COMPLETED | OUTPATIENT
Start: 2022-03-03 | End: 2022-03-03

## 2022-03-03 RX ORDER — ONDANSETRON 2 MG/ML
INJECTION INTRAMUSCULAR; INTRAVENOUS AS NEEDED
Status: DISCONTINUED | OUTPATIENT
Start: 2022-03-03 | End: 2022-03-03 | Stop reason: SURG

## 2022-03-03 RX ORDER — ONDANSETRON 2 MG/ML
4 INJECTION INTRAMUSCULAR; INTRAVENOUS EVERY 6 HOURS PRN
Status: DISCONTINUED | OUTPATIENT
Start: 2022-03-03 | End: 2022-03-05 | Stop reason: HOSPADM

## 2022-03-03 RX ORDER — PHENYLEPHRINE HYDROCHLORIDE 10 MG/ML
INJECTION INTRAVENOUS AS NEEDED
Status: DISCONTINUED | OUTPATIENT
Start: 2022-03-03 | End: 2022-03-03 | Stop reason: SURG

## 2022-03-03 RX ORDER — SODIUM CHLORIDE 0.9 % (FLUSH) 0.9 %
1-10 SYRINGE (ML) INJECTION AS NEEDED
Status: DISCONTINUED | OUTPATIENT
Start: 2022-03-03 | End: 2022-03-03

## 2022-03-03 RX ORDER — SIMETHICONE 80 MG
80 TABLET,CHEWABLE ORAL 4 TIMES DAILY PRN
Status: DISCONTINUED | OUTPATIENT
Start: 2022-03-03 | End: 2022-03-05 | Stop reason: HOSPADM

## 2022-03-03 RX ORDER — OXYTOCIN/0.9 % SODIUM CHLORIDE 30/500 ML
PLASTIC BAG, INJECTION (ML) INTRAVENOUS CONTINUOUS PRN
Status: DISCONTINUED | OUTPATIENT
Start: 2022-03-03 | End: 2022-03-03 | Stop reason: SURG

## 2022-03-03 RX ORDER — OXYTOCIN/0.9 % SODIUM CHLORIDE 30/500 ML
650 PLASTIC BAG, INJECTION (ML) INTRAVENOUS ONCE
Status: DISCONTINUED | OUTPATIENT
Start: 2022-03-03 | End: 2022-03-05 | Stop reason: HOSPADM

## 2022-03-03 RX ORDER — SCOLOPAMINE TRANSDERMAL SYSTEM 1 MG/1
PATCH, EXTENDED RELEASE TRANSDERMAL AS NEEDED
Status: DISCONTINUED | OUTPATIENT
Start: 2022-03-03 | End: 2022-03-03 | Stop reason: SURG

## 2022-03-03 RX ORDER — PRENATAL VIT/IRON FUM/FOLIC AC 27MG-0.8MG
1 TABLET ORAL DAILY
Status: DISCONTINUED | OUTPATIENT
Start: 2022-03-03 | End: 2022-03-05 | Stop reason: HOSPADM

## 2022-03-03 RX ORDER — FERROUS GLUCONATE 324(37.5)
324 TABLET ORAL 2 TIMES DAILY
Status: DISCONTINUED | OUTPATIENT
Start: 2022-03-03 | End: 2022-03-05 | Stop reason: HOSPADM

## 2022-03-03 RX ADMIN — ONDANSETRON HYDROCHLORIDE 4 MG: 4 TABLET, FILM COATED ORAL at 17:44

## 2022-03-03 RX ADMIN — SODIUM CHLORIDE, POTASSIUM CHLORIDE, SODIUM LACTATE AND CALCIUM CHLORIDE 1000 ML: 600; 310; 30; 20 INJECTION, SOLUTION INTRAVENOUS at 08:58

## 2022-03-03 RX ADMIN — ONDANSETRON 4 MG: 2 INJECTION INTRAMUSCULAR; INTRAVENOUS at 10:17

## 2022-03-03 RX ADMIN — KETOROLAC TROMETHAMINE 30 MG: 30 INJECTION, SOLUTION INTRAMUSCULAR; INTRAVENOUS at 17:42

## 2022-03-03 RX ADMIN — HYDROCODONE BITARTRATE AND ACETAMINOPHEN 1 TABLET: 5; 325 TABLET ORAL at 17:41

## 2022-03-03 RX ADMIN — HYDROCODONE BITARTRATE AND ACETAMINOPHEN 1 TABLET: 5; 325 TABLET ORAL at 13:48

## 2022-03-03 RX ADMIN — BUPIVACAINE HYDROCHLORIDE 1.4 ML: 7.5 INJECTION, SOLUTION EPIDURAL; RETROBULBAR at 10:35

## 2022-03-03 RX ADMIN — Medication 85 ML/HR: at 11:38

## 2022-03-03 RX ADMIN — SODIUM CHLORIDE, POTASSIUM CHLORIDE, SODIUM LACTATE AND CALCIUM CHLORIDE 125 ML/HR: 600; 310; 30; 20 INJECTION, SOLUTION INTRAVENOUS at 20:31

## 2022-03-03 RX ADMIN — KETOROLAC TROMETHAMINE 30 MG: 30 INJECTION, SOLUTION INTRAMUSCULAR; INTRAVENOUS at 11:13

## 2022-03-03 RX ADMIN — SODIUM CHLORIDE, POTASSIUM CHLORIDE, SODIUM LACTATE AND CALCIUM CHLORIDE: 600; 310; 30; 20 INJECTION, SOLUTION INTRAVENOUS at 10:33

## 2022-03-03 RX ADMIN — OXYTOCIN-SODIUM CHLORIDE 0.9% IV SOLN 30 UNIT/500ML 85 ML/HR: 30-0.9/5 SOLUTION at 11:38

## 2022-03-03 RX ADMIN — OXYTOCIN-SODIUM CHLORIDE 0.9% IV SOLN 30 UNIT/500ML 85 ML/HR: 30-0.9/5 SOLUTION at 12:43

## 2022-03-03 RX ADMIN — CEFAZOLIN SODIUM 2 G: 2 SOLUTION INTRAVENOUS at 10:36

## 2022-03-03 RX ADMIN — PHENYLEPHRINE HYDROCHLORIDE 100 MCG: 10 INJECTION INTRAVENOUS at 10:44

## 2022-03-03 RX ADMIN — Medication 324 MG: at 21:38

## 2022-03-03 RX ADMIN — MORPHINE SULFATE 0.15 MG: 1 INJECTION, SOLUTION EPIDURAL; INTRATHECAL; INTRAVENOUS at 10:35

## 2022-03-03 RX ADMIN — FAMOTIDINE 20 MG: 10 INJECTION, SOLUTION INTRAVENOUS at 10:17

## 2022-03-03 RX ADMIN — Medication 500 ML/HR: at 11:00

## 2022-03-03 RX ADMIN — FENTANYL CITRATE 10 MCG: 50 INJECTION INTRAMUSCULAR; INTRAVENOUS at 10:35

## 2022-03-03 RX ADMIN — SODIUM CHLORIDE, POTASSIUM CHLORIDE, SODIUM LACTATE AND CALCIUM CHLORIDE 125 ML/HR: 600; 310; 30; 20 INJECTION, SOLUTION INTRAVENOUS at 12:42

## 2022-03-03 RX ADMIN — SODIUM CHLORIDE, POTASSIUM CHLORIDE, SODIUM LACTATE AND CALCIUM CHLORIDE 125 ML/HR: 600; 310; 30; 20 INJECTION, SOLUTION INTRAVENOUS at 10:01

## 2022-03-03 RX ADMIN — SCOPALAMINE 1 PATCH: 1 PATCH, EXTENDED RELEASE TRANSDERMAL at 10:17

## 2022-03-03 RX ADMIN — HYDROCODONE BITARTRATE AND ACETAMINOPHEN 1 TABLET: 5; 325 TABLET ORAL at 21:38

## 2022-03-03 NOTE — PLAN OF CARE
Problem: Adult Inpatient Plan of Care  Goal: Plan of Care Review  Outcome: Ongoing, Progressing  Flowsheets (Taken 3/3/2022 1536)  Progress: improving  Plan of Care Reviewed With: patient  Outcome Summary: VSS. fundus firm and midline. lochia rubra and scant. pt reports pain controlled with PRN NORCO. urine output adequate. pt breast feeding and bonding well with baby.  Goal: Patient-Specific Goal (Individualized)  Outcome: Ongoing, Progressing  Goal: Absence of Hospital-Acquired Illness or Injury  Outcome: Ongoing, Progressing  Intervention: Identify and Manage Fall Risk  Recent Flowsheet Documentation  Taken 3/3/2022 09 by Rosa Mckoy RN  Safety Promotion/Fall Prevention: safety round/check completed  Intervention: Prevent and Manage VTE (venous thromboembolism) Risk  Recent Flowsheet Documentation  Taken 3/3/2022 1000 by Rosa Mckoy RN  VTE Prevention/Management:   bilateral   compression stockings on  Goal: Optimal Comfort and Wellbeing  Outcome: Ongoing, Progressing  Intervention: Provide Person-Centered Care  Recent Flowsheet Documentation  Taken 3/3/2022 09 by Rosa Mckoy RN  Trust Relationship/Rapport:   care explained   choices provided   emotional support provided   empathic listening provided   questions answered   questions encouraged   reassurance provided   thoughts/feelings acknowledged  Goal: Readiness for Transition of Care  Outcome: Ongoing, Progressing  Intervention: Mutually Develop Transition Plan  Recent Flowsheet Documentation  Taken 3/3/2022 0919 by Rosa Mckoy RN  Equipment Currently Used at Home: none  Taken 3/3/2022 0910 by Rosa Mckoy RN  Transportation Anticipated:   family or friend will provide   car, drives self  Patient/Family Anticipated Services at Transition: none  Patient/Family Anticipates Transition to: home     Problem: Bleeding ( Delivery)  Goal: Bleeding is Controlled  Outcome: Ongoing, Progressing     Problem: Change in  Fetal Wellbeing ( Delivery)  Goal: Stable Fetal Wellbeing  Outcome: Ongoing, Progressing     Problem: Infection ( Delivery)  Goal: Absence of Infection Signs and Symptoms  Outcome: Ongoing, Progressing     Problem: Respiratory Compromise ( Delivery)  Goal: Effective Oxygenation and Ventilation  Outcome: Ongoing, Progressing     Problem: Adjustment to Role Transition (Postpartum  Delivery)  Goal: Successful Maternal Role Transition  Outcome: Ongoing, Progressing     Problem: Bleeding (Postpartum  Delivery)  Goal: Hemostasis  Outcome: Ongoing, Progressing     Problem: Infection (Postpartum  Delivery)  Goal: Absence of Infection Signs and Symptoms  Outcome: Ongoing, Progressing     Problem: Pain (Postpartum  Delivery)  Goal: Acceptable Pain Control  Outcome: Ongoing, Progressing  Intervention: Prevent or Manage Pain  Recent Flowsheet Documentation  Taken 3/3/2022 1348 by Rosa Mckoy RN  Pain Management Interventions: see MAR     Problem: Postoperative Nausea and Vomiting (Postpartum  Delivery)  Goal: Nausea and Vomiting Relief  Outcome: Ongoing, Progressing     Problem: Postoperative Urinary Retention (Postpartum  Delivery)  Goal: Effective Urinary Elimination  Outcome: Ongoing, Progressing   Goal Outcome Evaluation:  Plan of Care Reviewed With: patient        Progress: improving  Outcome Summary: VSS. fundus firm and midline. lochia rubra and scant. pt reports pain controlled with PRN NORCO. urine output adequate. pt breast feeding and bonding well with baby.

## 2022-03-03 NOTE — OP NOTE
MONISHA Simpson   Section Operative Note    Pre-Operative Dx:   1) 31 y.o.   2) IUP at 39 0/7  3) Indications for  section: Velamentous insertion of umbilical cord, accessory lobe of the placenta         Postoperative dx:    1.  Same     Procedure: , Low Transverse    Surgeon: Ethan Lewis     Assistant: NAZ Matos responsible for retracting, helping with delivery of infant, closing incision.   Anesthesia: Spinal    EBL:   mls.  1000 mls.         IV Fluids: 1750 mls.   UOP: 200 mls.    I/O this shift:  In: 1700 [I.V.:1700]  Out: 1050 [Urine:50; Blood:1000]   Antibiotics: cefazolin (Ancef)     Infant:      Time of Delivery     Gender: male  infant    Weight: 3118 g (6 lb 14 oz)     Apgars: 8  @ 1 minute /     9  @ 5 minutes      Meconium:   Nuchal Cord:    no  no            Indication for C/Section: see above                            Velementous cord insertion         Procedure Details:   Once all questions were answered, the patient was given IV antibiotics for ID prophylaxis. Pt was taken to the OR where spinal anesthesia was administered. A parsons catheter was placed and hung to gravity for the duration of the procedure. SCD's were placed on the lower extremities bilaterally for DVT prophylaxis. Once the pt was prepped and draped and anesthesia was found to be adequate, a Pfannensteil skin incision was made on the abdomen and carried down to the fascia. The fascia was incised and extended with Weber scissors. Kocher clamps were placed on the superior and inferior aspect of the fascia and the rectus abdominal muscles were sharply and bluntly taken down. The rectus abdominal muscles were  in the midline and the periteoneum was entered and bluntly extended. A bladder blade was then inserted and a bladder flap was created. A low transverse incision was made on the uterus and bluntly extended. Artificial rupture of membranes was performed with clear fluid noted. The  fetal head was delivered followed by the rest of the body. Cord was clamped and cut and baby taken to the warmer. Cord blood was collected and the placenta was delivered.  There was a marginal to velamentous insertion of the umbilical cord at the edge of the placental plate.  In addition there were vessels that ran from the velamentous insertion to the accessory lobe which was separate from the large placental plate but still within the membranes.  It was about 6 cm in diameter.  The uterus was exteriorized and cleared of all clots and debris. The uterine incision was repaired in 2 layers. The first layer was a running and locked fashion with 0-Vicryl and the second layer was an imbricating repair with 0-Vicryl suture. The uterus was firm and hemostatic. The abdomen was irrigated and aspirated with warm normal saline. The uterus was placed back into the abdomen. The fascia was reapproximated with 0-vicryl suture. The subcutaneous layer was irrigated and cauterized as needed for hemostasis. The skin was reapproximated with 4-0 vicryl. Pt tolerated the procedure well. Mom and baby are stable and progressing well.      Complications:   None      Disposition:   Mother to Mother Baby/Postpartum  in stable condition currently.   Baby to NBN  in stable condition currently.       Ethan Myers MD  3/3/2022  14:38 EST

## 2022-03-03 NOTE — ANESTHESIA POSTPROCEDURE EVALUATION
Patient: Lisa Davenport    Procedure Summary     Date: 22 Room / Location: [facility] LABOR DELIVERY 1   [facility] LABOR DELIVERY    Anesthesia Start: 1031 Anesthesia Stop: 1136    Procedure:  SECTION PRIMARY (N/A Abdomen) Diagnosis:       Prenatal care in third trimester      Marginal insertion of umbilical cord affecting management of mother      (Prenatal care in third trimester [Z34.93])      (Marginal insertion of umbilical cord affecting management of mother [O43.199])    Surgeons: Ethan Myers MD Provider: Zulma Agrawal CRNA    Anesthesia Type: spinal, ITN ASA Status: 2          Anesthesia Type: spinal, ITN    Vitals  Vitals Value Taken Time   /53 22 1137   Temp     Pulse 78 22 1151   Resp 18 22 1137   SpO2 91 % 22 1151   Vitals shown include unvalidated device data.        Post Anesthesia Care and Evaluation    Patient location during evaluation: bedside  Patient participation: complete - patient cannot participate  Level of consciousness: awake  Pain score: 0  Pain management: adequate  Airway patency: patent  Anesthetic complications: No anesthetic complications  PONV Status: none  Cardiovascular status: acceptable  Respiratory status: acceptable  Hydration status: acceptable

## 2022-03-03 NOTE — ANESTHESIA PREPROCEDURE EVALUATION
Anesthesia Evaluation     Patient summary reviewed and Nursing notes reviewed   NPO Solid Status: > 8 hours  NPO Liquid Status: > 8 hours           Airway   Mallampati: II  TM distance: >3 FB  Neck ROM: full  no difficulty expected  Dental          Pulmonary - negative pulmonary ROS and normal exam    breath sounds clear to auscultation  Cardiovascular - negative cardio ROS and normal exam  Exercise tolerance: good (4-7 METS)    Rhythm: regular  Rate: normal        Neuro/Psych- negative ROS  GI/Hepatic/Renal/Endo - negative ROS     Musculoskeletal     (+) back pain, myalgias ( fibromyalgia),   Abdominal    Substance History - negative use     OB/GYN    (+) Pregnant,         Other - negative ROS                         Anesthesia Plan    ASA 2     spinal and ITN     intravenous induction     Anesthetic plan, all risks, benefits, and alternatives have been provided, discussed and informed consent has been obtained with: patient and spouse/significant other.  Use of blood products discussed with patient and spouse/significant other .   Plan discussed with CRNA.

## 2022-03-03 NOTE — H&P
Patient Care Team:  System, Provider Not In as PCP - General    Chief complaint IUP at 39-0/7 weeks, velamentous insertion of the cord       HPI: This is a 31-year-old  2 para 1-0-0-1 at 39-0/7 weeks who presents for primary .  She has a known antepartum identified velamentous insertion of the cord as well and accessory lobe to the placenta.  The fetus has had normal growth throughout prenatal care.  She has had no vaginal bleeding.  Patient was counseled about induction of labor, possible complications of velamentous insertion and options of elective primary  delivery.  Risk benefits alternatives of all her options were discussed and patient elects for primary  delivery to decrease fetal risk of hemorrhage.  Today she has no complaints.  She has occasional contractions over the last 2 to 3 days.  No vaginal bleeding and no loss of fluid.    ROS:   Constitutional: Negative for appetite change, fever and unexpected weight change.   Respiratory: Negative for cough and shortness of breath.    Cardiovascular: Negative for chest pain and palpitations.   Gastrointestinal: Negative for abdominal pain, constipation, diarrhea, nausea and vomiting.   Endocrine: Negative.    Genitourinary: Negative for dyspareunia, pelvic pain and vaginal discharge.   Skin: Negative.    Neurological: Negative for dizziness and headaches.   Hematological: Negative.    Psychiatric/Behavioral: Negative for dysphoric mood and sleep disturbance. The patient is not nervous/anxious.        PMH:   Past Medical History:   Diagnosis Date   • ADD (attention deficit disorder)          PSH:   Past Surgical History:   Procedure Laterality Date   • WISDOM TOOTH EXTRACTION         SoHx:   Social History     Socioeconomic History   • Marital status:      Spouse name: Papa   • Number of children: 1   Tobacco Use   • Smoking status: Never Smoker   • Smokeless tobacco: Never Used   Vaping Use   • Vaping Use: Never used    Substance and Sexual Activity   • Alcohol use: Not Currently     Comment: social   • Drug use: No   • Sexual activity: Yes     Partners: Male     Birth control/protection: None       FHx:   Family History   Problem Relation Age of Onset   • No Known Problems Mother    • No Known Problems Father    • Breast cancer Neg Hx    • Ovarian cancer Neg Hx    • Uterine cancer Neg Hx    • Colon cancer Neg Hx    • Deep vein thrombosis Neg Hx    • Pulmonary embolism Neg Hx        PGyn Hx: Deferred    POBHx: Please see HPI    Allergies: Patient has no known allergies.    Medications:   No current facility-administered medications on file prior to encounter.     Current Outpatient Medications on File Prior to Encounter   Medication Sig Dispense Refill   • ferrous gluconate (FERGON) 324 MG tablet Take 1 tablet by mouth 2 (Two) Times a Day. 60 tablet 2   • Prenatal Vit-Fe Fumarate-FA (prenatal vitamin 28-0.8) 28-0.8 MG tablet tablet Take  by mouth Daily.               Vital Signs  Temp:  [97.9 °F (36.6 °C)] 97.9 °F (36.6 °C)  Heart Rate:  [73-85] 73  Resp:  [18] 18  BP: ()/(53-60) 99/56    Physical Exam:  Constitutional: She is oriented to person, place, and time. She appears well-developed and well-nourished.   HENT:   Head: Normocephalic and atraumatic.   Cardiovascular: Normal rate and regular rhythm.    Pulmonary/Chest: Effort normal. No respiratory distress.   Abdominal: Soft. Bowel sounds are normal. She exhibits no distension and no mass. There is no tenderness. There is no rebound and no guarding.   Musculoskeletal: Normal range of motion.   Neurological: She is alert and oriented to person, place, and time.   Skin: Skin is warm and dry.   Psychiatric: She has a normal mood and affect. Her behavior is normal. Judgment and thought content normal.   Nursing note and vitals reviewed.  Debilities/Disabilities Identified: None  Emotional Behavior: Appropriate    Labs: O+, GBS negative, Covid negative, preop hemoglobin  11.5      Assessment/Plan: 31-year-old  2 para 1-0-0-1 at 39-0/7 weeks, velamentous insertion of cord, accessory lobe to the placenta    Plan primary low transverse  section.    I discussed the patients findings and my recommendations with patient.     Ethan Myers MD  22  10:12 EST

## 2022-03-03 NOTE — ANESTHESIA PROCEDURE NOTES
Spinal Block      Patient reassessed immediately prior to procedure    Patient location during procedure: OB  Start Time: 3/3/2022 10:33 AM  Stop Time: 3/3/2022 10:35 AM  Indication:at surgeon's request, post-op pain management and procedure for pain  Performed By  CRNA: Zulma Agrawal CRNA  Preanesthetic Checklist  Completed: patient identified, IV checked, site marked, risks and benefits discussed, surgical consent, monitors and equipment checked, pre-op evaluation and timeout performed  Spinal Block Prep:  Patient Position:sitting  Sterile Tech:cap, gloves, mask and sterile barriers  Prep:Chloraprep  Patient Monitoring:blood pressure monitoring, continuous pulse oximetry and EKG  Spinal Block Procedure  Approach:midline  Guidance:landmark technique and palpation technique  Location:L3-L4  Needle Type:Sprotte  Needle Gauge:25 G  Placement of Spinal needle event:cerebrospinal fluid aspirated  Paresthesia: no  Fluid Appearance:clear  Medications: bupivacaine PF (MARCAINE) injection 0.75%, 1.4 mL  Med Administered at 3/3/2022 10:35 AM   Post Assessment  Patient Tolerance:patient tolerated the procedure well with no apparent complications  Complications no

## 2022-03-04 LAB
BASOPHILS # BLD AUTO: 0.02 10*3/MM3 (ref 0–0.2)
BASOPHILS NFR BLD AUTO: 0.2 % (ref 0–1.5)
DEPRECATED RDW RBC AUTO: 48.6 FL (ref 37–54)
EOSINOPHIL # BLD AUTO: 0.06 10*3/MM3 (ref 0–0.4)
EOSINOPHIL NFR BLD AUTO: 0.6 % (ref 0.3–6.2)
ERYTHROCYTE [DISTWIDTH] IN BLOOD BY AUTOMATED COUNT: 13.8 % (ref 12.3–15.4)
HCT VFR BLD AUTO: 28.7 % (ref 34–46.6)
HGB BLD-MCNC: 9.3 G/DL (ref 12–15.9)
IMM GRANULOCYTES # BLD AUTO: 0.05 10*3/MM3 (ref 0–0.05)
IMM GRANULOCYTES NFR BLD AUTO: 0.5 % (ref 0–0.5)
LYMPHOCYTES # BLD AUTO: 1.53 10*3/MM3 (ref 0.7–3.1)
LYMPHOCYTES NFR BLD AUTO: 14.8 % (ref 19.6–45.3)
MCH RBC QN AUTO: 31.2 PG (ref 26.6–33)
MCHC RBC AUTO-ENTMCNC: 32.4 G/DL (ref 31.5–35.7)
MCV RBC AUTO: 96.3 FL (ref 79–97)
MONOCYTES # BLD AUTO: 1.12 10*3/MM3 (ref 0.1–0.9)
MONOCYTES NFR BLD AUTO: 10.8 % (ref 5–12)
NEUTROPHILS NFR BLD AUTO: 7.56 10*3/MM3 (ref 1.7–7)
NEUTROPHILS NFR BLD AUTO: 73.1 % (ref 42.7–76)
NRBC BLD AUTO-RTO: 0 /100 WBC (ref 0–0.2)
PLATELET # BLD AUTO: 154 10*3/MM3 (ref 140–450)
PMV BLD AUTO: 11.4 FL (ref 6–12)
RBC # BLD AUTO: 2.98 10*6/MM3 (ref 3.77–5.28)
WBC NRBC COR # BLD: 10.34 10*3/MM3 (ref 3.4–10.8)

## 2022-03-04 PROCEDURE — 0503F POSTPARTUM CARE VISIT: CPT | Performed by: OBSTETRICS & GYNECOLOGY

## 2022-03-04 PROCEDURE — 85025 COMPLETE CBC W/AUTO DIFF WBC: CPT | Performed by: OBSTETRICS & GYNECOLOGY

## 2022-03-04 PROCEDURE — 25010000002 KETOROLAC TROMETHAMINE PER 15 MG: Performed by: OBSTETRICS & GYNECOLOGY

## 2022-03-04 PROCEDURE — 94799 UNLISTED PULMONARY SVC/PX: CPT

## 2022-03-04 RX ORDER — IBUPROFEN 800 MG/1
800 TABLET ORAL EVERY 8 HOURS PRN
Status: DISCONTINUED | OUTPATIENT
Start: 2022-03-04 | End: 2022-03-05 | Stop reason: HOSPADM

## 2022-03-04 RX ADMIN — Medication 324 MG: at 09:32

## 2022-03-04 RX ADMIN — HYDROCODONE BITARTRATE AND ACETAMINOPHEN 2 TABLET: 5; 325 TABLET ORAL at 13:19

## 2022-03-04 RX ADMIN — HYDROCODONE BITARTRATE AND ACETAMINOPHEN 2 TABLET: 5; 325 TABLET ORAL at 22:42

## 2022-03-04 RX ADMIN — HYDROCODONE BITARTRATE AND ACETAMINOPHEN 2 TABLET: 5; 325 TABLET ORAL at 18:12

## 2022-03-04 RX ADMIN — Medication 324 MG: at 20:24

## 2022-03-04 RX ADMIN — HYDROCODONE BITARTRATE AND ACETAMINOPHEN 1 TABLET: 5; 325 TABLET ORAL at 04:03

## 2022-03-04 RX ADMIN — KETOROLAC TROMETHAMINE 30 MG: 30 INJECTION, SOLUTION INTRAMUSCULAR; INTRAVENOUS at 06:11

## 2022-03-04 RX ADMIN — PRENATAL VIT W/ FE FUMARATE-FA TAB 27-0.8 MG 1 TABLET: 27-0.8 TAB at 09:32

## 2022-03-04 RX ADMIN — KETOROLAC TROMETHAMINE 30 MG: 30 INJECTION, SOLUTION INTRAMUSCULAR; INTRAVENOUS at 00:17

## 2022-03-04 RX ADMIN — IBUPROFEN 800 MG: 800 TABLET, FILM COATED ORAL at 12:22

## 2022-03-04 RX ADMIN — HYDROCODONE BITARTRATE AND ACETAMINOPHEN 1 TABLET: 5; 325 TABLET ORAL at 09:32

## 2022-03-04 NOTE — PLAN OF CARE
Problem: Adult Inpatient Plan of Care  Goal: Plan of Care Review  Outcome: Ongoing, Progressing  Flowsheets  Taken 3/4/2022 0606 by Renetta Meier, RN  Progress: improving  Outcome Summary:   VSS. Fundus and lochia WNL. Toradol given as scheduled   PO pain meds given. Adequate urine output   F/C removed. Breastfeeding, pumping and supplementing w/ bottle. Bonding well w/ infant.  Taken 3/3/2022 1536 by Rosa Mckoy, RN  Plan of Care Reviewed With: patient   Goal Outcome Evaluation:           Progress: improving  Outcome Summary: VSS. Fundus and lochia WNL. Toradol given as scheduled; PO pain meds given. Adequate urine output; F/C removed. Breastfeeding, pumping and supplementing w/ bottle. Bonding well w/ infant.

## 2022-03-04 NOTE — PROGRESS NOTES
"MONISHA Ragsdale    Progress Note       Patient Name: Lisa Davenport  :  1990  MRN:  [id number]          Subjective  Postpartum Day 1:     The patient feels well.  Her pain is well controlled with prescribed pain medications.   She is ambulating well.  Patient describes her bleeding as moderate lochia. She denies any weakness or dizziness. She declines circ.     Breastfeeding: without difficulty.           BP (!) 89/50 (BP Location: Right arm, Patient Position: Lying)   Pulse 64   Temp 97.9 °F (36.6 °C) (Oral)   Resp 16   Ht 160 cm (63\")   Wt 78.5 kg (173 lb)   LMP 2021   SpO2 96%   Breastfeeding Yes   BMI 30.65 kg/m²     Review of Systems   Constitutional: Positive for activity change and fatigue.   Gastrointestinal: Positive for abdominal pain.   Genitourinary: Positive for vaginal bleeding.   Psychiatric/Behavioral: Positive for sleep disturbance.     Physical Exam:    Physical Exam  Vitals and nursing note reviewed.   Constitutional:       Appearance: Normal appearance. She is well-developed.   HENT:      Head: Normocephalic and atraumatic.   Eyes:      General: No scleral icterus.     Conjunctiva/sclera: Conjunctivae normal.   Neck:      Thyroid: No thyromegaly.   Abdominal:      General: There is no distension.      Palpations: Abdomen is soft. There is no mass.      Tenderness: There is no abdominal tenderness. There is no guarding or rebound.      Hernia: No hernia is present.      Comments: Bandage C/DII, no erythema   Musculoskeletal:         General: No swelling or tenderness.      Cervical back: Neck supple.      Right lower leg: Edema (1+ edema B) present.      Left lower leg: Edema present.   Skin:     General: Skin is warm and dry.   Neurological:      Mental Status: She is alert and oriented to person, place, and time.   Psychiatric:         Mood and Affect: Mood normal.         Behavior: Behavior normal.         Thought Content: Thought content normal.         Judgment: Judgment " normal.           Lab results reviewed:  Yes.   Results from last 7 days   Lab Units 03/04/22  0529   HEMOGLOBIN g/dL 9.3*     Infant: male  Feeding:breast  Rh status: positive, Rhogam was not indicated  Rubella: Immune  Contraception: undecided              1) POD#1: Progressing well. Hgb:9.3, pt HD stable. Will start daily iron.     2) Postpartum Care: advance    3) Dispo: home tomorrow or Sunday if well.         Moriah Hardy MD    3/4/2022  10:34 EST

## 2022-03-04 NOTE — PROGRESS NOTES
Patient: Lias Davenport    @A@    Anesthesia Type: spinal, ITN  Patient location: Labor and Delivery  Last vitals  BP     Temp      Pulse     Resp      SpO2        Post vital signs: stable  Level of consciousness: awake, alert and oriented    Post-anesthesia pain: adequate analgesia  Airway patency: patent  Respiratory: unassisted  Cardiovascular: stable and blood pressure at baseline  Hydration: euvolemic    Difficult Airway: no  Anesthetic complications: no

## 2022-03-05 VITALS
BODY MASS INDEX: 30.65 KG/M2 | SYSTOLIC BLOOD PRESSURE: 117 MMHG | WEIGHT: 173 LBS | HEIGHT: 63 IN | RESPIRATION RATE: 16 BRPM | DIASTOLIC BLOOD PRESSURE: 56 MMHG | TEMPERATURE: 98.6 F | HEART RATE: 77 BPM | OXYGEN SATURATION: 96 %

## 2022-03-05 PROCEDURE — 0503F POSTPARTUM CARE VISIT: CPT | Performed by: OBSTETRICS & GYNECOLOGY

## 2022-03-05 RX ORDER — HYDROCODONE BITARTRATE AND ACETAMINOPHEN 5; 325 MG/1; MG/1
1 TABLET ORAL EVERY 4 HOURS PRN
Qty: 30 TABLET | Refills: 0 | Status: SHIPPED | OUTPATIENT
Start: 2022-03-05 | End: 2022-03-11

## 2022-03-05 RX ORDER — IBUPROFEN 800 MG/1
800 TABLET ORAL EVERY 8 HOURS PRN
Qty: 30 TABLET | Refills: 0 | Status: SHIPPED | OUTPATIENT
Start: 2022-03-05 | End: 2022-04-13

## 2022-03-05 RX ADMIN — IBUPROFEN 800 MG: 800 TABLET, FILM COATED ORAL at 08:25

## 2022-03-05 RX ADMIN — Medication 324 MG: at 08:25

## 2022-03-05 RX ADMIN — HYDROCODONE BITARTRATE AND ACETAMINOPHEN 2 TABLET: 5; 325 TABLET ORAL at 13:07

## 2022-03-05 RX ADMIN — PRENATAL VIT W/ FE FUMARATE-FA TAB 27-0.8 MG 1 TABLET: 27-0.8 TAB at 08:25

## 2022-03-05 NOTE — PLAN OF CARE
Problem: Adult Inpatient Plan of Care  Goal: Plan of Care Review  Outcome: Met  Goal: Patient-Specific Goal (Individualized)  Outcome: Met  Goal: Absence of Hospital-Acquired Illness or Injury  Outcome: Met  Intervention: Identify and Manage Fall Risk  Recent Flowsheet Documentation  Taken 3/5/2022 08 by Amita Palomo RN  Safety Promotion/Fall Prevention: safety round/check completed  Intervention: Prevent Skin Injury  Recent Flowsheet Documentation  Taken 3/5/2022 08 by Amita Palomo RN  Body Position: sitting up in bed  Goal: Optimal Comfort and Wellbeing  Outcome: Met  Intervention: Provide Person-Centered Care  Recent Flowsheet Documentation  Taken 3/5/2022 0825 by Amita Palomo RN  Trust Relationship/Rapport:   care explained   choices provided  Goal: Readiness for Transition of Care  Outcome: Met     Problem: Adjustment to Role Transition (Postpartum  Delivery)  Goal: Successful Maternal Role Transition  Outcome: Met     Problem: Bleeding (Postpartum  Delivery)  Goal: Hemostasis  Outcome: Met     Problem: Infection (Postpartum  Delivery)  Goal: Absence of Infection Signs and Symptoms  Outcome: Met     Problem: Pain (Postpartum  Delivery)  Goal: Acceptable Pain Control  Outcome: Met  Intervention: Prevent or Manage Pain  Recent Flowsheet Documentation  Taken 3/5/2022 1344 by Amita Palomo RN  Pain Management Interventions: no interventions per patient request  Taken 3/5/2022 1307 by Amita Palomo RN  Pain Management Interventions: see MAR  Taken 3/5/2022 0900 by Amita Palomo RN  Pain Management Interventions: no interventions per patient request  Taken 3/5/2022 0825 by Amita Palomo RN  Pain Management Interventions: (stronger pain medication offered but pt only wants ibuprofen at the moment) see MAR     Problem: Postoperative Nausea and Vomiting (Postpartum  Delivery)  Goal: Nausea and Vomiting Relief  Outcome: Met     Problem: Postoperative Urinary  Retention (Postpartum  Delivery)  Goal: Effective Urinary Elimination  Outcome: Met     Problem: Adult Inpatient Plan of Care  Goal: Plan of Care Review  Outcome: Met  Goal: Patient-Specific Goal (Individualized)  Outcome: Met  Goal: Absence of Hospital-Acquired Illness or Injury  Outcome: Met  Intervention: Identify and Manage Fall Risk  Recent Flowsheet Documentation  Taken 3/5/2022 0825 by Amita Palomo RN  Safety Promotion/Fall Prevention: safety round/check completed  Intervention: Prevent Skin Injury  Recent Flowsheet Documentation  Taken 3/5/2022 0825 by Amita Palomo RN  Body Position: sitting up in bed  Goal: Optimal Comfort and Wellbeing  Outcome: Met  Intervention: Provide Person-Centered Care  Recent Flowsheet Documentation  Taken 3/5/2022 0825 by Amita Palomo RN  Trust Relationship/Rapport:   care explained   choices provided  Goal: Readiness for Transition of Care  Outcome: Met     Problem: Adjustment to Role Transition (Postpartum  Delivery)  Goal: Successful Maternal Role Transition  Outcome: Met     Problem: Bleeding (Postpartum  Delivery)  Goal: Hemostasis  Outcome: Met     Problem: Infection (Postpartum  Delivery)  Goal: Absence of Infection Signs and Symptoms  Outcome: Met     Problem: Pain (Postpartum  Delivery)  Goal: Acceptable Pain Control  Outcome: Met  Intervention: Prevent or Manage Pain  Recent Flowsheet Documentation  Taken 3/5/2022 1344 by Amita Palomo RN  Pain Management Interventions: no interventions per patient request  Taken 3/5/2022 1307 by Amita Palomo RN  Pain Management Interventions: see MAR  Taken 3/5/2022 0900 by Amita Palomo RN  Pain Management Interventions: no interventions per patient request  Taken 3/5/2022 0825 by Amita Palomo RN  Pain Management Interventions: (stronger pain medication offered but pt only wants ibuprofen at the moment) see MAR     Problem: Postoperative Nausea and Vomiting (Postpartum   Delivery)  Goal: Nausea and Vomiting Relief  Outcome: Met     Problem: Postoperative Urinary Retention (Postpartum  Delivery)  Goal: Effective Urinary Elimination  Outcome: Met   Goal Outcome Evaluation:

## 2022-03-05 NOTE — PLAN OF CARE
Problem: Adult Inpatient Plan of Care  Goal: Plan of Care Review  Outcome: Ongoing, Progressing  Flowsheets (Taken 3/4/2022 1927)  Progress: improving  Plan of Care Reviewed With: patient  Outcome Summary: gilmar gurrola, patient ambulating in room and up ad liborio, has taken a shower, pain controlled with prn pain meds  Goal: Patient-Specific Goal (Individualized)  Outcome: Ongoing, Progressing  Goal: Absence of Hospital-Acquired Illness or Injury  Outcome: Ongoing, Progressing  Intervention: Identify and Manage Fall Risk  Recent Flowsheet Documentation  Taken 3/4/2022 1812 by Elsy Garces RN  Safety Promotion/Fall Prevention: safety round/check completed  Taken 3/4/2022 1645 by Elsy Garces RN  Safety Promotion/Fall Prevention: safety round/check completed  Taken 3/4/2022 1430 by Elsy Garces RN  Safety Promotion/Fall Prevention: safety round/check completed  Taken 3/4/2022 0930 by Elsy Garces RN  Safety Promotion/Fall Prevention: safety round/check completed  Taken 3/4/2022 0720 by Elsy Garces RN  Safety Promotion/Fall Prevention: safety round/check completed  Goal: Optimal Comfort and Wellbeing  Outcome: Ongoing, Progressing  Intervention: Provide Person-Centered Care  Recent Flowsheet Documentation  Taken 3/4/2022 1700 by Elsy Garces RN  Trust Relationship/Rapport:   care explained   choices provided   reassurance provided   thoughts/feelings acknowledged   emotional support provided   empathic listening provided  Goal: Readiness for Transition of Care  Outcome: Ongoing, Progressing     Problem: Adjustment to Role Transition (Postpartum  Delivery)  Goal: Successful Maternal Role Transition  Outcome: Ongoing, Progressing     Problem: Bleeding (Postpartum  Delivery)  Goal: Hemostasis  Outcome: Ongoing, Progressing     Problem: Infection (Postpartum  Delivery)  Goal: Absence of Infection Signs and Symptoms  Outcome: Ongoing, Progressing     Problem: Pain (Postpartum   Delivery)  Goal: Acceptable Pain Control  Outcome: Ongoing, Progressing  Intervention: Prevent or Manage Pain  Recent Flowsheet Documentation  Taken 3/4/2022 1812 by Elsy Garces, RN  Pain Management Interventions:   see MAR   quiet environment facilitated     Problem: Postoperative Nausea and Vomiting (Postpartum  Delivery)  Goal: Nausea and Vomiting Relief  Outcome: Ongoing, Progressing     Problem: Postoperative Urinary Retention (Postpartum  Delivery)  Goal: Effective Urinary Elimination  Outcome: Ongoing, Progressing   Goal Outcome Evaluation:  Plan of Care Reviewed With: patient        Progress: improving  Outcome Summary: gilmar gurrola wdl, patient ambulating in room and up ad liborio, has taken a shower, pain controlled with prn pain meds

## 2022-03-05 NOTE — EXTERNAL PATIENT INSTRUCTIONS
"Patient Education   Table of Contents       Breastfeeding and Breast Care       Postpartum Care After  Delivery     To view videos and all your education online visit,   [email or web address]   or scan this QR code with your smartphone.                  Breastfeeding and Breast Care     Breastfeeding can be challenging, especially during the first few weeks after childbirth. It is normal to have some problems when you start to breastfeed your new baby, even if you have  before.   There are things that you can do to take care of yourself and help prevent common breastfeeding problems. Work with your health care provider or breastfeeding specialist (lactation consultant) to find strategies that work best for you.   How does self-care during breastfeeding benefit me?    Keeping your breasts healthy and ensuring that your baby attaches to your nipple well (good latch) are important components of a good breastfeeding experience. A good latch ensures that you will avoid common problems, such as:       Cracked or sore nipples.       Breasts becoming overfilled with milk (engorgement).       Plugged milk ducts.       Low milk supply.       Breast inflammation or infection.     How does self-care benefit my baby?   By taking steps to avoid breastfeeding problems, you help ensure that your baby can feed effectively and gain weight as he or she should.   What actions can I take to care for myself during breastfeeding?   Breastfeeding strategy         Always make sure that your baby latches and is in a proper position. Try different breastfeeding positions to find one that works best for you and your baby.       Breastfeed when you feel the need to reduce the fullness of your breasts or when your baby shows signs of hunger. This is called \"breastfeeding on demand.\"      Do not  delay feedings.       Try to relax when it is time to feed your baby. This helps to trigger your let-down reflex, which " releases milk from your breast.      To help increase milk flow:       Pump or hand express a small amount of breast milk right before breastfeeding to soften your breast, areola, and nipple.       Apply warm, moist heat to your breast right before feeding to increase circulation and help milk flow. You can do this in the shower or with hand towels soaked with warm water.       Massage your breast right before or during feeding to increase circulation and help milk flow.     Breast care              Ensure that your breasts stay moisturized and healthy. This will help prevent cracking and ease soreness. To do this:       Avoid using soap on your nipples.      Let your nipples air-dry for 3?4 minutes after each feeding.      Do not  use drying aids like a hair dryer to dry breasts. This can cause the skin to further become dry, leading to more irritation.           Use only cotton bra pads to absorb breast milk that leaks. Be sure to change the pads if they become soaked with milk. If you use disposable bra pads, change them often.       Use lanolin on your nipples after nursing. If you use pure lanolin, you do not need to wash it off before feeding your baby again. Pure lanolin is not poisonous to your baby.      Massage some breast milk into your nipples.       Use your hand to squeeze out a few drops of breast milk (hand express).       Gently massage the milk into your nipples.       Let your nipples air-dry.         Wear a supportive nursing bra. Avoid wearing tight clothing, bras that put pressure on your breasts, or underwire bras.      Use cold therapy to help relieve pain or swelling of your breasts. To do this:       Put ice in a plastic bag.       Place a towel between your skin and the bag.       Leave the ice on for 20 minutes, 2?3 times a day.       Follow these instructions at home:         Drink enough fluid to keep your urine pale yellow.       Get plenty of rest. Sleep when your baby sleeps.        Talk to your health care provider or lactation consultant before taking any herbal supplements.       Eat foods that have good nutrients. Eat a balanced diet of fruits, vegetables, whole grains, lean proteins, and dairy or dairy alternatives.     Contact a health care provider if:         You have nipple pain.       You have cracking or soreness in your nipples that lasts longer than 1 week.       You have breast engorgement that lasts longer than 48 hours.       You have a fever.       You have pus-like discharge coming from your nipple.       You have redness, a rash, swelling, itching, or burning on your breast.       Your baby does not gain weight or loses weight.       Your baby is not feeding regularly or is very sleepy or lethargic.     Summary         Keeping your breasts healthy and ensuring a good latch are important components of a good breastfeeding experience.       There are things that you can do to take care of yourself and help prevent common breastfeeding problems. Work with your health care provider or breastfeeding specialist (lactation consultant) to find strategies that work best for you.       Always make sure that your baby is latched and positioned properly. Try different breastfeeding positions to find one that works best for you and your baby.       Keep your nipples moisturized, drink plenty of fluid, and get plenty of rest. Feed on demand, and do not  delay feedings.     This information is not intended to replace advice given to you by your health care provider. Make sure you discuss any questions you have with your health care provider.     Document Released: 2018Document Revised: 1Document Reviewed: 2021     [employer] Patient Education ?  [employer]         Postpartum Care After  Delivery     This sheet gives you information about how to care for yourself from the time you deliver your baby to up to 6?12 weeks after delivery (postpartum period). Your  health care provider may also give you more specific instructions. If you have problems or questions, contact your health care provider.   Follow these instructions at home:   Medicines         Take over-the-counter and prescription medicines only as told by your health care provider.       If you were prescribed an antibiotic medicine, take it as told by your health care provider. Do not  stop taking the antibiotic even if you start to feel better.      Ask your health care provider if the medicine prescribed to you:       Requires you to avoid driving or using heavy machinery.      Can cause constipation. You may need to take actions to prevent or treat constipation, such as:       Drink enough fluid to keep your urine pale yellow.       Take over-the-counter or prescription medicines.       Eat foods that are high in fiber, such as beans, whole grains, and fresh fruits and vegetables.       Limit foods that are high in fat and processed sugars, such as fried or sweet foods.         Activity         Gradually return to your normal activities as told by your health care provider.      Avoid activities that take a lot of effort and energy (are strenuous) until approved by your health care provider. Walking at a slow to moderate pace is usually safe. Ask your health care provider what activities are safe for you.      Do not  lift anything that is heavier than your baby or 10 lb (4.5 kg) as told by your health care provider.      Do not  vacuum, climb stairs, or drive a car for as long as told by your health care provider.       If possible, have someone help you at home until you are able to do your usual activities yourself.       Rest as much as possible. Try to rest or take naps while your baby is sleeping.     Vaginal bleeding        It is normal to have vaginal bleeding (lochia) after delivery. Wear a sanitary pad to absorb vaginal bleeding and discharge.       During the first week after delivery, the amount  and appearance of lochia is often similar to a menstrual period.       Over the next few weeks, it will gradually decrease to a dry, yellow-brown discharge.       For most women, lochia stops completely by 4?6 weeks after delivery. Vaginal bleeding can vary from woman to woman.      Change your sanitary pads frequently. Watch for any changes in your flow, such as:       A sudden increase in volume.       A change in color.       Large blood clots.       If you pass a blood clot, save it and call your health care provider to discuss. Do not  flush blood clots down the toilet before you get instructions from your health care provider.      Do not  use tampons or douches until your health care provider says this is safe.       If you are not breastfeeding, your period should return 6?8 weeks after delivery. If you are breastfeeding, your period may return anytime between 8 weeks after delivery and the time that you stop breastfeeding.     Perineal care           If your  ( section) was unplanned, and you were allowed to labor and push before delivery, you may have pain, swelling, and discomfort of the tissue between your vaginal opening and your anus (perineum). You may also have an incision in the tissue (episiotomy) or the tissue may have torn during delivery. Follow these instructions as told by your health care provider:       Keep your perineum clean and dry as told by your health care provider. Use medicated pads and pain-relieving sprays and creams as directed.      If you have an episiotomy or vaginal tear, check the area every day for signs of infection. Check for:       Redness, swelling, or pain.       Fluid or blood.       Warmth.       Pus or a bad smell.           You may be given a squirt bottle to use instead of wiping to clean the perineum area after you go to the bathroom. As you start healing, you may use the squirt bottle before wiping yourself. Make sure to wipe gently.       To  relieve pain caused by an episiotomy, vaginal tear, or hemorrhoids, try taking a warm sitz bath 2?3 times a day. A sitz bath is a warm water bath that is taken while you are sitting down. The water should only come up to your hips and should cover your buttocks.     Breast care         Within the first few days after delivery, your breasts may feel heavy, full, and uncomfortable (breast engorgement). You may also have milk leaking from your breasts. Your health care provider can suggest ways to help relieve breast discomfort. Breast engorgement should go away within a few days.      If you are breastfeeding:       Wear a bra that supports your breasts and fits you well.       Keep your nipples clean and dry. Apply creams and ointments as told by your health care provider.       You may need to use breast pads to absorb milk leakage.       You may have uterine contractions every time you breastfeed for several weeks after delivery. Uterine contractions help your uterus return to its normal size.       If you have any problems with breastfeeding, work with your health care provider or a lactation consultant.      If you are not breastfeeding:       Avoid touching your breasts as this can make your breasts produce more milk.       Wear a well-fitting bra and use cold packs to help with swelling.      Do not  squeeze out (express) milk. This causes you to make more milk.     Intimacy and sexuality        Ask your health care provider when you can engage in sexual activity. This may depend on your:       Risk of infection.       Healing rate.       Comfort and desire to engage in sexual activity.       You are able to get pregnant after delivery, even if you have not had your period. If desired, talk with your health care provider about methods of family planning or birth control (contraception).     Lifestyle        Do not  use any products that contain nicotine or tobacco, such as cigarettes, e-cigarettes, and chewing  tobacco. If you need help quitting, ask your health care provider.      Do not  drink alcohol, especially if you are breastfeeding.     Eating and drinking            Drink enough fluid to keep your urine pale yellow.      Eat high-fiber foods every day. These may help prevent or relieve constipation. High-fiber foods include:       Whole grain cereals and breads.       Brown rice.       Beans.       Fresh fruits and vegetables.       Take your prenatal vitamins until your postpartum checkup or until your health care provider tells you it is okay to stop.       General instructions         Keep all follow-up visits for you and your baby as told by your health care provider. Most women visit their health care provider for a postpartum checkup within the first 3?6 weeks after delivery.       Contact a health care provider if you:         Feel unable to cope with the changes that a new baby brings to your life, and these feelings do not go away.       Feel unusually sad or worried.       Have breasts that are painful, hard, or turn red.       Have a fever.       Have trouble holding urine or keeping urine from leaking.       Have little or no interest in activities you used to enjoy.       Have not  at all and you have not had a menstrual period for 12 weeks after delivery.       Have stopped breastfeeding and you have not had a menstrual period for 12 weeks after you stopped breastfeeding.       Have questions about caring for yourself or your baby.       Pass a blood clot from your vagina.     Get help right away if you:         Have chest pain.       Have difficulty breathing.       Have sudden, severe leg pain.       Have severe pain or cramping in your abdomen.       Bleed from your vagina so much that you fill more than one sanitary pad in one hour. Bleeding should not be heavier than your heaviest period.       Develop a severe headache.       Faint.       Have blurred vision or spots in your vision.        Have a bad-smelling vaginal discharge.       Have thoughts about hurting yourself or your baby.     If you ever feel like you may hurt yourself or others, or have thoughts about taking your own life, get help right away. You can go to your nearest emergency department or call:      Your local emergency services (911 in the [place]      A suicide crisis helpline, such as the National Suicide Prevention Lifeline at 1-738.402.3035. This is open 24 hours a day.     Summary         The period of time from when you deliver your baby to up to 6?12 weeks after delivery is called the postpartum period.       Gradually return to your normal activities as told by your health care provider.       Keep all follow-up visits for you and your baby as told by your health care provider.     This information is not intended to replace advice given to you by your health care provider. Make sure you discuss any questions you have with your health care provider.     Document Released: 12/15/2001Document Revised: 08/07/2019Document Reviewed: 08/07/2019     [employer] Patient Education ? 2021 [employer]

## 2022-03-05 NOTE — PLAN OF CARE
Goal Outcome Evaluation:  Plan of Care Reviewed With: spouse, patient        Progress: improving  Outcome Summary: VSS. bleeding scant, PT reports being up and walking often, PT pain controlled with PO meds

## 2022-03-05 NOTE — DISCHARGE SUMMARY
"Obstetrical Discharge Form    Primary OB Clinician: [facility]      Preadmission Diagnosis:  1. Lisa Davenport is a 31 y.o.  with IUP @ 39w0d   2. Velamentous cord insertion and accessory placental lobe      Discharge Diagnosis:  Same, plus:   3. S/P 1 LTCS    Antepartum complications: see above    Date of Delivery:  3/3/2022     Delivered By: Ethan Lewis     Delivery Type: primary  section, low transverse incision    Tubal Ligation: n/a    Baby: Liveborn male, Apgars 8/9, weight 6 #, 14 oz,   110  oz    Anesthesia: spinal    Intrapartum complications: None    Laceration: n/a    Feeding method: breast    Hospital Course: Lisa Davenport is a 31 y.o.   who presented with an IUP 39w0d presented for a scheduled 1 LTCS for a velamentous insertion of the cord as well and accessory lobe to the placenta.  Patient was counseled about induction of labor, possible complications of velamentous insertion and options of elective primary  delivery to decrease fetal risk of hemorrhage. She had an uncomplicated operative and postoperative course. By POD # 2, she was ambulating, tolerating a regular diet and desired to go home. She plans on a Mirena IUD pp. Postop instructions and restrictions were reviewed with the patient and all questions were answered.     Discharge Date: 3/5/2022; Discharge Time: 11:22 EST    Review of Systems   Constitutional: Positive for activity change. Negative for fatigue.   Gastrointestinal: Positive for abdominal pain (mild inc pain).   Genitourinary: Positive for vaginal bleeding.   Psychiatric/Behavioral: Positive for sleep disturbance.   All other systems reviewed and are negative.    /56 (BP Location: Left arm, Patient Position: Sitting)   Pulse 77   Temp 98.6 °F (37 °C) (Oral)   Resp 16   Ht 160 cm (63\")   Wt 78.5 kg (173 lb)   LMP 2021   SpO2 96%   Breastfeeding Yes   BMI 30.65 kg/m²      Physical Exam  Vitals and nursing note reviewed. "   Constitutional:       Appearance: She is well-developed.   HENT:      Head: Normocephalic and atraumatic.   Eyes:      General: No scleral icterus.     Conjunctiva/sclera: Conjunctivae normal.   Neck:      Thyroid: No thyromegaly.   Abdominal:      General: There is no distension.      Palpations: Abdomen is soft. There is no mass.      Tenderness: There is no abdominal tenderness. There is no guarding or rebound.      Hernia: No hernia is present.      Comments: bdg C/D/I   Musculoskeletal:      Cervical back: Neck supple.   Skin:     General: Skin is warm and dry.   Neurological:      Mental Status: She is alert and oriented to person, place, and time.   Psychiatric:         Mood and Affect: Mood normal.         Behavior: Behavior normal.         Thought Content: Thought content normal.         Judgment: Judgment normal.         Results from last 7 days   Lab Units 03/04/22  0529   HEMOGLOBIN g/dL 9.3*     Infant: male  Feeding:breast  Rh status: positive, Rhogam was not indicated  Rubella: Immune  Diabetes: no  Contraception: IUD Mirena        Plan:    Patient given written instruction sheet.  Follow-up appointment with [facility]  in 2 weeks.    Discharge time was less than 30 minutes.     Moriah Hardy MD  11:22 EST  3/5/2022

## 2022-03-06 NOTE — CASE MANAGEMENT/SOCIAL WORK
Case Management Discharge Note      Final Note: dc home         Selected Continued Care - Discharged on 3/5/2022 Admission date: 3/3/2022 - Discharge disposition: Home or Self Care    Destination    No services have been selected for the patient.              Durable Medical Equipment    No services have been selected for the patient.              Dialysis/Infusion    No services have been selected for the patient.              Home Medical Care    No services have been selected for the patient.              Therapy    No services have been selected for the patient.              Community Resources    No services have been selected for the patient.              Community & DME    No services have been selected for the patient.                       Final Discharge Disposition Code: 01 - home or self-care

## 2022-03-10 LAB
LAB AP CASE REPORT: NORMAL
PATH REPORT.FINAL DX SPEC: NORMAL

## 2022-03-16 ENCOUNTER — POSTPARTUM VISIT (OUTPATIENT)
Dept: OBSTETRICS AND GYNECOLOGY | Facility: CLINIC | Age: 32
End: 2022-03-16

## 2022-03-16 VITALS
BODY MASS INDEX: 27.64 KG/M2 | DIASTOLIC BLOOD PRESSURE: 70 MMHG | HEIGHT: 63 IN | WEIGHT: 156 LBS | SYSTOLIC BLOOD PRESSURE: 112 MMHG

## 2022-03-16 PROCEDURE — 0503F POSTPARTUM CARE VISIT: CPT | Performed by: OBSTETRICS & GYNECOLOGY

## 2022-03-16 NOTE — PROGRESS NOTES
"      Lisa Davenport is a 31 y.o. patient who presents for follow up of   Chief Complaint   Patient presents with   • Postpartum Care     2 week incision check       HPI 2 weeks status post primary  delivery for velamentous insertion and accessory lobe of the placenta.  Patient is doing quite well.  Pain is well controlled.  Her bleeding has lightened.  She denies any baby blues.  She is happy with her recovery so far.    The following portions of the patient's history were reviewed and updated as appropriate: allergies, current medications and problem list.    Review of Systems   Constitutional: Negative for appetite change, fever and unexpected weight change.   HENT: Negative for congestion and sore throat.    Respiratory: Negative for cough and shortness of breath.    Cardiovascular: Negative for chest pain and palpitations.   Gastrointestinal: Negative for abdominal distention, abdominal pain, constipation, diarrhea, nausea and vomiting.   Endocrine: Negative.    Genitourinary: Negative for dyspareunia, menstrual problem, pelvic pain and vaginal discharge.   Skin: Negative.    Neurological: Negative for dizziness and syncope.   Hematological: Negative.    Psychiatric/Behavioral: Negative for dysphoric mood and sleep disturbance. The patient is not nervous/anxious.        /70   Ht 160 cm (63\")   Wt 70.8 kg (156 lb)   LMP 2021   Breastfeeding Yes   BMI 27.63 kg/m²     Physical Exam  Vitals and nursing note reviewed.   Constitutional:       Appearance: She is well-developed.   HENT:      Head: Normocephalic and atraumatic.   Pulmonary:      Effort: Pulmonary effort is normal.   Abdominal:      General: Bowel sounds are normal. There is no distension.      Palpations: Abdomen is soft. There is no mass.      Tenderness: There is no abdominal tenderness. There is no guarding or rebound.      Comments: C/S incision well healed   Genitourinary:     Vagina: Normal.   Musculoskeletal:         " General: Normal range of motion.   Skin:     General: Skin is warm and dry.   Neurological:      Mental Status: She is alert and oriented to person, place, and time.   Psychiatric:         Behavior: Behavior normal.         Thought Content: Thought content normal.         Judgment: Judgment normal.           Assessment/Plan    Diagnoses and all orders for this visit:    1. Postpartum care and examination (Primary)     scar healing well.  Patient doing well.  Patient desires Mirena IUD to be placed at her normal 6-week postpartum check.  I have sent a message to get that ordered.  Follow-up in 4 weeks for her routine postpartum check and IUD insertion.    Return in about 4 weeks (around 2022) for Postpartum + IUD insertion.      Ethan Myers MD  3/16/2022  13:59 EDT

## 2022-03-24 RX ORDER — DOXYCYCLINE HYCLATE 50 MG/1
CAPSULE, GELATIN COATED ORAL
Qty: 180 TABLET | Refills: 0 | Status: SHIPPED | OUTPATIENT
Start: 2022-03-24 | End: 2022-06-30

## 2022-04-01 ENCOUNTER — TELEPHONE (OUTPATIENT)
Dept: OBSTETRICS AND GYNECOLOGY | Facility: CLINIC | Age: 32
End: 2022-04-01

## 2022-04-01 NOTE — TELEPHONE ENCOUNTER
Provider: SIN    Caller: LINWOOD BROWNLEE    Relationship to Patient: SELF    Pharm[id number] [address] AT [facility] [phone] [facility] [phone] FX     Phone Number: [phone]    Reason for Call: PT IS POSTPARTUM AND IS BREASTFEEDING. SHE HAS HAD SOME PAIN ON THE LEFT BREAST AND WHEN BREAST FEEDING. SHE IS ALSO RUNNING A FEVER OF BETWEEN 100 & 101. SHE IS CONCERNED ABOUT INFECTION AND WANTS TO KNOW WHAT SHE NEEDS TO DO OR IF YOU CAN CALL HER IN AN ANTIBIOTIC. PLEASE CALL HER AS SOON AS POSSIBLE AS SHE IS UNABLE TO BREAST FEED ON THAT SIDE.

## 2022-04-01 NOTE — TELEPHONE ENCOUNTER
I CALLED DR RICHARDS, SHE IS HAVING ME CALL IN DICLOXICILLIN 500MG QID 7 DAYS FOR THE PATIENT. USE WARM COMPRESSES AND MASSAGE THE BREAST, CONTINUE TO NURSE OR PUMP. IF NOT ANY BETTER BY Monday, WE NEED TO SEE HER IN THE OFFICE. I CALLED THE PATIENT AND LEFT THE PROVIDED INFORMATION ON HER VOICE MAIL. I ALSO CALLED IN THE MEDICATION TO HER PHARMACY PROVIDED IN HER CHART.

## 2022-04-13 ENCOUNTER — POSTPARTUM VISIT (OUTPATIENT)
Dept: OBSTETRICS AND GYNECOLOGY | Facility: CLINIC | Age: 32
End: 2022-04-13

## 2022-04-13 VITALS
HEIGHT: 63 IN | WEIGHT: 153 LBS | BODY MASS INDEX: 27.11 KG/M2 | SYSTOLIC BLOOD PRESSURE: 110 MMHG | DIASTOLIC BLOOD PRESSURE: 68 MMHG

## 2022-04-13 DIAGNOSIS — Z30.430 ENCOUNTER FOR IUD INSERTION: ICD-10-CM

## 2022-04-13 LAB
B-HCG UR QL: NEGATIVE
BILIRUB BLD-MCNC: NEGATIVE MG/DL
CLARITY, POC: CLEAR
COLOR UR: YELLOW
EXPIRATION DATE: NORMAL
GLUCOSE UR STRIP-MCNC: NEGATIVE MG/DL
INTERNAL NEGATIVE CONTROL: NEGATIVE
INTERNAL POSITIVE CONTROL: POSITIVE
KETONES UR QL: NEGATIVE
LEUKOCYTE EST, POC: NEGATIVE
Lab: NORMAL
NITRITE UR-MCNC: NEGATIVE MG/ML
PH UR: 5 [PH] (ref 5–8)
PROT UR STRIP-MCNC: NEGATIVE MG/DL
RBC # UR STRIP: NEGATIVE /UL
SP GR UR: 1.03 (ref 1–1.03)
UROBILINOGEN UR QL: NORMAL

## 2022-04-13 PROCEDURE — 58300 INSERT INTRAUTERINE DEVICE: CPT | Performed by: OBSTETRICS & GYNECOLOGY

## 2022-04-13 PROCEDURE — 81025 URINE PREGNANCY TEST: CPT | Performed by: OBSTETRICS & GYNECOLOGY

## 2022-04-13 PROCEDURE — 0503F POSTPARTUM CARE VISIT: CPT | Performed by: OBSTETRICS & GYNECOLOGY

## 2022-04-13 NOTE — PROGRESS NOTES
"      Lisa Davenport is a 31 y.o. patient who presents for follow up of   Chief Complaint   Patient presents with   • Postpartum Care   • Procedure     IUD INSERTION         HPI 6 weeks status post primary  for abnormal cord insertion.  Patient is doing quite well.  No pelvic pain.  She desires Mirena for IUD.  She is breast-feeding without difficulty.  She denies any baby blues.  Her bleeding has stopped.    The following portions of the patient's history were reviewed and updated as appropriate: allergies, current medications and problem list.    Review of Systems   Constitutional: Negative for appetite change, fever and unexpected weight change.   HENT: Negative for congestion and sore throat.    Respiratory: Negative for cough and shortness of breath.    Cardiovascular: Negative for chest pain and palpitations.   Gastrointestinal: Negative for abdominal distention, abdominal pain, constipation, diarrhea, nausea and vomiting.   Endocrine: Negative.    Genitourinary: Negative for dyspareunia, menstrual problem, pelvic pain and vaginal discharge.   Skin: Negative.    Neurological: Negative for dizziness and syncope.   Hematological: Negative.    Psychiatric/Behavioral: Negative for dysphoric mood and sleep disturbance. The patient is not nervous/anxious.        /68   Ht 160 cm (63\")   Wt 69.4 kg (153 lb)   Breastfeeding Yes   BMI 27.10 kg/m²     Physical Exam  Vitals and nursing note reviewed.   Constitutional:       Appearance: She is well-developed.   HENT:      Head: Normocephalic and atraumatic.   Pulmonary:      Effort: Pulmonary effort is normal.   Abdominal:      General: Bowel sounds are normal. There is no distension.      Palpations: Abdomen is soft. There is no mass.      Tenderness: There is no abdominal tenderness. There is no guarding or rebound.      Comments: C/S incision well healed   Genitourinary:     Vagina: Normal.   Musculoskeletal:         General: Normal range of motion. "   Skin:     General: Skin is warm and dry.   Neurological:      Mental Status: She is alert and oriented to person, place, and time.   Psychiatric:         Behavior: Behavior normal.         Thought Content: Thought content normal.         Judgment: Judgment normal.     Procedure: Intrauterine device insertion    Pre procedure indication 1) Desires Mirena  Post procedure indication 1) Desires Mirena    The risks, benefits, and alternatives to an intrauterine device were explained at length with the patient. All her questions were answered and consents were signed.    The patient was placed in a dorsal lithotomy position on the examining table in [facility]. A bimanual exam confirmed the uterus was normal in size, anteverted. A warmed metal speculum was inserted into the vagina and the cervix was brought into view.    The cervix was prepped with Betadine. The anterior lip was grasped with a single-tooth tenaculum. The endometrial cavity was then sounded to 7 cm without use of a dilator. This sealed IUD package was opened and the IUD was removed in a sterile fashion.    The upper edge of the depth setting the flange was set at a uterine sound measured. The  was then carefully advanced to the cervical canal into the uterus to the level of the fundus. This was then backed off about 1.5-2 cm to allow sufficient space for the arms to open. The slider was then retracted about 1 cm and deployed the device. The device was then gently advanced to the fundus. The IUD was then released by pulling the slider down all the way. The  was removed carefully from the uterus. The threads were then cut leaving 2-3 cm visible outside of the cervix.  The single-tooth tenaculum was removed from the anterior lip. Good hemostasis was noted.     All other instruments were removed from the vagina.   There were no complications.  The patient tolerated the procedure well with a minimal amount of discomfort.    The patient was  counseled about the need to return in 4 weeks for string check.     She was counseled about the need to use a backup method of contraception such as condoms until her post insertion exam was performed. The patient verbalized understanding that the IUD will need to be removed after it expires. The patient has chosen the MMIUDSELECTION: Mirena (5 year device).  The patient is counseled to contact us if she has any significant or increasing bleeding, pain, fever, chills, or other concerns. She is instructed to see a doctor right away if she believes that she may be pregnant at any time with the IUD in place.    Assessment/Plan    Diagnoses and all orders for this visit:    1. Postpartum care and examination (Primary)  -     POC Urinalysis Dipstick  -     POC Pregnancy, Urine    2. Encounter for IUD insertion    Doing well.  Tolerated IUD insertion well.  Follow-up in 1 year for annual physical.    Return in about 1 year (around 4/13/2023) for Annual physical.      Ethan Myers MD  4/13/2022  15:20 EDT

## 2022-04-28 ENCOUNTER — OFFICE VISIT (OUTPATIENT)
Dept: OBSTETRICS AND GYNECOLOGY | Facility: CLINIC | Age: 32
End: 2022-04-28

## 2022-04-28 VITALS
SYSTOLIC BLOOD PRESSURE: 104 MMHG | WEIGHT: 155.2 LBS | BODY MASS INDEX: 27.5 KG/M2 | DIASTOLIC BLOOD PRESSURE: 62 MMHG | HEIGHT: 63 IN

## 2022-04-28 DIAGNOSIS — Z30.431 IUD CHECK UP: Primary | ICD-10-CM

## 2022-04-28 PROCEDURE — 99212 OFFICE O/P EST SF 10 MIN: CPT | Performed by: OBSTETRICS & GYNECOLOGY

## 2022-04-28 RX ORDER — DEXTROAMPHETAMINE SACCHARATE, AMPHETAMINE ASPARTATE, DEXTROAMPHETAMINE SULFATE AND AMPHETAMINE SULFATE 7.5; 7.5; 7.5; 7.5 MG/1; MG/1; MG/1; MG/1
30 TABLET ORAL DAILY
COMMUNITY
Start: 2022-04-20 | End: 2022-05-20

## 2022-04-28 NOTE — PROGRESS NOTES
"       Lisa Davenport is a 31 y.o. patient who presents for follow up of   Chief Complaint   Patient presents with   • IUD CHECK       HPI 31-year-old female status post Mirena placement.  She denies any pain.  Her bleeding has stopped.  She wants her strings trimmed because her  can feel it during intercourse and it is uncomfortable.  She has no other complaints.    The following portions of the patient's history were reviewed and updated as appropriate: allergies, current medications and problem list.    Review of Systems   Constitutional: Negative for appetite change, fever and unexpected weight change.   HENT: Negative for congestion and sore throat.    Respiratory: Negative for cough and shortness of breath.    Cardiovascular: Negative for chest pain and palpitations.   Gastrointestinal: Negative for abdominal distention, abdominal pain, constipation, diarrhea, nausea and vomiting.   Endocrine: Negative.    Genitourinary: Negative for dyspareunia, menstrual problem, pelvic pain and vaginal discharge.   Skin: Negative.    Neurological: Negative for dizziness and syncope.   Hematological: Negative.    Psychiatric/Behavioral: Negative for dysphoric mood and sleep disturbance. The patient is not nervous/anxious.        /62   Ht 160 cm (63\")   Wt 70.4 kg (155 lb 3.2 oz)   BMI 27.49 kg/m²     Physical Exam  Vitals and nursing note reviewed. Exam conducted with a chaperone present.   Constitutional:       Appearance: She is well-developed.   HENT:      Head: Normocephalic and atraumatic.   Pulmonary:      Effort: Pulmonary effort is normal. No respiratory distress.   Abdominal:      General: There is no distension.      Palpations: Abdomen is soft. There is no mass.      Tenderness: There is no abdominal tenderness. There is no guarding or rebound.   Genitourinary:     General: Normal vulva.      Exam position: Supine.      Vagina: Foreign body (strings seen and trimmed) present. No signs of injury. " No vaginal discharge, tenderness, bleeding or lesions.      Cervix: Normal.   Musculoskeletal:         General: Normal range of motion.   Skin:     General: Skin is warm and dry.   Neurological:      Mental Status: She is alert and oriented to person, place, and time.   Psychiatric:         Behavior: Behavior normal.         Thought Content: Thought content normal.         Judgment: Judgment normal.           Assessment/Plan    Diagnoses and all orders for this visit:    1. IUD check up (Primary)    Strings trimmed effectively.  Doing well.  Follow-up as needed.    Return if symptoms worsen or fail to improve.      Ethan Myers MD  4/28/2022  13:39 EDT

## 2022-06-30 RX ORDER — DOXYCYCLINE HYCLATE 50 MG/1
CAPSULE, GELATIN COATED ORAL
Qty: 180 TABLET | Refills: 0 | Status: SHIPPED | OUTPATIENT
Start: 2022-06-30

## 2023-09-17 ENCOUNTER — APPOINTMENT (OUTPATIENT)
Dept: CT IMAGING | Facility: HOSPITAL | Age: 33
End: 2023-09-17
Payer: COMMERCIAL

## 2023-09-17 ENCOUNTER — HOSPITAL ENCOUNTER (EMERGENCY)
Facility: HOSPITAL | Age: 33
Discharge: HOME | End: 2023-09-17
Attending: EMERGENCY MEDICINE | Admitting: EMERGENCY MEDICINE
Payer: COMMERCIAL

## 2023-09-17 VITALS
BODY MASS INDEX: 23.92 KG/M2 | HEART RATE: 76 BPM | WEIGHT: 130 LBS | SYSTOLIC BLOOD PRESSURE: 87 MMHG | RESPIRATION RATE: 16 BRPM | DIASTOLIC BLOOD PRESSURE: 54 MMHG | OXYGEN SATURATION: 96 % | TEMPERATURE: 98.4 F | HEIGHT: 62 IN

## 2023-09-17 DIAGNOSIS — R11.2 NAUSEA AND VOMITING, UNSPECIFIED VOMITING TYPE: ICD-10-CM

## 2023-09-17 DIAGNOSIS — F10.920 ACUTE ALCOHOLIC INTOXICATION WITHOUT COMPLICATION: ICD-10-CM

## 2023-09-17 DIAGNOSIS — K29.00 ACUTE GASTRITIS WITHOUT HEMORRHAGE, UNSPECIFIED GASTRITIS TYPE: ICD-10-CM

## 2023-09-17 DIAGNOSIS — R74.8 ELEVATED LIVER ENZYMES: ICD-10-CM

## 2023-09-17 DIAGNOSIS — R10.10 UPPER ABDOMINAL PAIN: Primary | ICD-10-CM

## 2023-09-17 LAB
ALBUMIN SERPL-MCNC: 4.7 G/DL (ref 3.5–5.2)
ALBUMIN/GLOB SERPL: 1.9 G/DL
ALP SERPL-CCNC: 60 U/L (ref 39–117)
ALT SERPL W P-5'-P-CCNC: 34 U/L (ref 1–33)
ANION GAP SERPL CALCULATED.3IONS-SCNC: 13 MMOL/L (ref 5–15)
AST SERPL-CCNC: 41 U/L (ref 1–32)
BASOPHILS # BLD AUTO: 0.02 10*3/MM3 (ref 0–0.2)
BASOPHILS NFR BLD AUTO: 0.2 % (ref 0–1.5)
BILIRUB SERPL-MCNC: <0.2 MG/DL (ref 0–1.2)
BILIRUB UR QL STRIP: NEGATIVE
BUN SERPL-MCNC: 11 MG/DL (ref 6–20)
BUN/CREAT SERPL: 19.6 (ref 7–25)
CALCIUM SPEC-SCNC: 9 MG/DL (ref 8.6–10.5)
CHLORIDE SERPL-SCNC: 106 MMOL/L (ref 98–107)
CLARITY UR: CLEAR
CO2 SERPL-SCNC: 21 MMOL/L (ref 22–29)
COLOR UR: YELLOW
CREAT SERPL-MCNC: 0.56 MG/DL (ref 0.57–1)
DEPRECATED RDW RBC AUTO: 42.9 FL (ref 37–54)
EGFRCR SERPLBLD CKD-EPI 2021: 124.5 ML/MIN/1.73
EOSINOPHIL # BLD AUTO: 0.02 10*3/MM3 (ref 0–0.4)
EOSINOPHIL NFR BLD AUTO: 0.2 % (ref 0.3–6.2)
ERYTHROCYTE [DISTWIDTH] IN BLOOD BY AUTOMATED COUNT: 12.4 % (ref 12.3–15.4)
ETHANOL BLD-MCNC: 164 MG/DL (ref 0–10)
ETHANOL UR QL: 0.16 %
GLOBULIN UR ELPH-MCNC: 2.5 GM/DL
GLUCOSE SERPL-MCNC: 105 MG/DL (ref 65–99)
GLUCOSE UR STRIP-MCNC: NEGATIVE MG/DL
HCG SERPL QL: NEGATIVE
HCT VFR BLD AUTO: 39.3 % (ref 34–46.6)
HGB BLD-MCNC: 13.4 G/DL (ref 12–15.9)
HGB UR QL STRIP.AUTO: NEGATIVE
HOLD SPECIMEN: NORMAL
IMM GRANULOCYTES # BLD AUTO: 0.05 10*3/MM3 (ref 0–0.05)
IMM GRANULOCYTES NFR BLD AUTO: 0.5 % (ref 0–0.5)
KETONES UR QL STRIP: ABNORMAL
LDH SERPL-CCNC: 213 U/L (ref 135–214)
LEUKOCYTE ESTERASE UR QL STRIP.AUTO: NEGATIVE
LIPASE SERPL-CCNC: 14 U/L (ref 13–60)
LYMPHOCYTES # BLD AUTO: 1.78 10*3/MM3 (ref 0.7–3.1)
LYMPHOCYTES NFR BLD AUTO: 18.2 % (ref 19.6–45.3)
MCH RBC QN AUTO: 32.4 PG (ref 26.6–33)
MCHC RBC AUTO-ENTMCNC: 34.1 G/DL (ref 31.5–35.7)
MCV RBC AUTO: 95.2 FL (ref 79–97)
MONOCYTES # BLD AUTO: 0.48 10*3/MM3 (ref 0.1–0.9)
MONOCYTES NFR BLD AUTO: 4.9 % (ref 5–12)
NEUTROPHILS NFR BLD AUTO: 7.45 10*3/MM3 (ref 1.7–7)
NEUTROPHILS NFR BLD AUTO: 76 % (ref 42.7–76)
NITRITE UR QL STRIP: NEGATIVE
NRBC BLD AUTO-RTO: 0 /100 WBC (ref 0–0.2)
PH UR STRIP.AUTO: 7 [PH] (ref 5–8)
PLATELET # BLD AUTO: 280 10*3/MM3 (ref 140–450)
PMV BLD AUTO: 10.6 FL (ref 6–12)
POTASSIUM SERPL-SCNC: 4 MMOL/L (ref 3.5–5.2)
PROT SERPL-MCNC: 7.2 G/DL (ref 6–8.5)
PROT UR QL STRIP: NEGATIVE
RBC # BLD AUTO: 4.13 10*6/MM3 (ref 3.77–5.28)
SODIUM SERPL-SCNC: 140 MMOL/L (ref 136–145)
SP GR UR STRIP: 1.02 (ref 1–1.03)
UROBILINOGEN UR QL STRIP: ABNORMAL
WBC NRBC COR # BLD: 9.8 10*3/MM3 (ref 3.4–10.8)
WHOLE BLOOD HOLD COAG: NORMAL
WHOLE BLOOD HOLD SPECIMEN: NORMAL

## 2023-09-17 PROCEDURE — 81003 URINALYSIS AUTO W/O SCOPE: CPT

## 2023-09-17 PROCEDURE — 99285 EMERGENCY DEPT VISIT HI MDM: CPT

## 2023-09-17 PROCEDURE — 84703 CHORIONIC GONADOTROPIN ASSAY: CPT

## 2023-09-17 PROCEDURE — 83690 ASSAY OF LIPASE: CPT

## 2023-09-17 PROCEDURE — 96374 THER/PROPH/DIAG INJ IV PUSH: CPT

## 2023-09-17 PROCEDURE — 25010000002 ONDANSETRON PER 1 MG: Performed by: EMERGENCY MEDICINE

## 2023-09-17 PROCEDURE — 80053 COMPREHEN METABOLIC PANEL: CPT

## 2023-09-17 PROCEDURE — 96361 HYDRATE IV INFUSION ADD-ON: CPT

## 2023-09-17 PROCEDURE — 85025 COMPLETE CBC W/AUTO DIFF WBC: CPT

## 2023-09-17 PROCEDURE — 96375 TX/PRO/DX INJ NEW DRUG ADDON: CPT

## 2023-09-17 PROCEDURE — 25510000001 IOPAMIDOL 61 % SOLUTION: Performed by: EMERGENCY MEDICINE

## 2023-09-17 PROCEDURE — 82077 ASSAY SPEC XCP UR&BREATH IA: CPT | Performed by: EMERGENCY MEDICINE

## 2023-09-17 PROCEDURE — 74177 CT ABD & PELVIS W/CONTRAST: CPT

## 2023-09-17 PROCEDURE — 83615 LACTATE (LD) (LDH) ENZYME: CPT | Performed by: EMERGENCY MEDICINE

## 2023-09-17 PROCEDURE — 25010000002 MORPHINE PER 10 MG: Performed by: EMERGENCY MEDICINE

## 2023-09-17 RX ORDER — SODIUM CHLORIDE 0.9 % (FLUSH) 0.9 %
10 SYRINGE (ML) INJECTION AS NEEDED
Status: DISCONTINUED | OUTPATIENT
Start: 2023-09-17 | End: 2023-09-17 | Stop reason: HOSPADM

## 2023-09-17 RX ORDER — PANTOPRAZOLE SODIUM 40 MG/1
40 TABLET, DELAYED RELEASE ORAL DAILY
Qty: 14 TABLET | Refills: 0 | Status: SHIPPED | OUTPATIENT
Start: 2023-09-17 | End: 2023-10-01

## 2023-09-17 RX ORDER — SUCRALFATE 1 G/1
1 TABLET ORAL 4 TIMES DAILY
Qty: 20 TABLET | Refills: 0 | Status: SHIPPED | OUTPATIENT
Start: 2023-09-17

## 2023-09-17 RX ORDER — ONDANSETRON 8 MG/1
8 TABLET, ORALLY DISINTEGRATING ORAL EVERY 8 HOURS PRN
Qty: 15 TABLET | Refills: 0 | Status: SHIPPED | OUTPATIENT
Start: 2023-09-17

## 2023-09-17 RX ORDER — ONDANSETRON 2 MG/ML
8 INJECTION INTRAMUSCULAR; INTRAVENOUS ONCE
Status: COMPLETED | OUTPATIENT
Start: 2023-09-17 | End: 2023-09-17

## 2023-09-17 RX ORDER — FAMOTIDINE 10 MG/ML
20 INJECTION, SOLUTION INTRAVENOUS ONCE
Status: COMPLETED | OUTPATIENT
Start: 2023-09-17 | End: 2023-09-17

## 2023-09-17 RX ORDER — MORPHINE SULFATE 2 MG/ML
4 INJECTION, SOLUTION INTRAMUSCULAR; INTRAVENOUS ONCE
Status: COMPLETED | OUTPATIENT
Start: 2023-09-17 | End: 2023-09-17

## 2023-09-17 RX ADMIN — ONDANSETRON 8 MG: 2 INJECTION INTRAMUSCULAR; INTRAVENOUS at 03:05

## 2023-09-17 RX ADMIN — SODIUM CHLORIDE 1000 ML: 9 INJECTION, SOLUTION INTRAVENOUS at 03:04

## 2023-09-17 RX ADMIN — SODIUM CHLORIDE 1000 ML: 9 INJECTION, SOLUTION INTRAVENOUS at 03:48

## 2023-09-17 RX ADMIN — FAMOTIDINE 20 MG: 10 INJECTION INTRAVENOUS at 03:48

## 2023-09-17 RX ADMIN — IOPAMIDOL 85 ML: 612 INJECTION, SOLUTION INTRAVENOUS at 03:17

## 2023-09-17 RX ADMIN — MORPHINE SULFATE 4 MG: 2 INJECTION, SOLUTION INTRAMUSCULAR; INTRAVENOUS at 03:05

## 2023-09-17 NOTE — ED PROVIDER NOTES
EMERGENCY DEPARTMENT ENCOUNTER  Room Number:    Date of encounter:  2023  PCP: System, Provider Not In  Patient Care Team:  System, Provider Not In as PCP - General     HPI:  Context: Lisa Davenport is a 32 y.o. female who presents to the ED c/o chief complaint of abdominal pain.  Patient reports that she [place] [employer] [place] earlier [place], drink alcohol, got home at approximately 1130, was having some mild abdominal pain, lay down, abdominal pain became much worse.  Patient complains of sharp stabbing upper abdominal pain, greatest in the epigastrium, pain does not radiate, patient denies any aggravating or ameliorating factors.  Patient reports 1 episode nausea vomiting, last bowel movement yesterday, normal in character, no urinary symptoms, no vaginal bleeding discharge or irritation.  Patient reports that she does not have regular menstrual period secondary to IUD, reports history of prior , no other abdominal surgeries.    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    Pregnancy 10/06/2021    Marginal insertion of umbilical cord and accessory lobe of placenta 10/22/2021    Influenza 2021    Prenatal care in third trimester 2021    Hip pain 2021    Back pain affecting pregnancy in third trimester 2021    Family history of genetic disease- pt's jade wiht trisomy 22, she declines genetics referral 2022    Anemia, unspecified 2022    Velamentous insertion of umbilical cord in third trimester 2022     Resolved Ambulatory Problems     Diagnosis Date Noted    Vaginal delivery 2017    ADD (attention deficit disorder)     Initial obstetric visit in first trimester 2021    Pregnancy 10/06/2021     No Additional Past Medical History        PAST SURGICAL HISTORY  Past Surgical History:   Procedure Laterality Date     SECTION N/A 3/3/2022    Procedure:  SECTION PRIMARY;  Surgeon: Ethan Myers MD;  Location: [facility] LABOR DELIVERY;  Service: Obstetrics/Gynecology;  Laterality: N/A;    WISDOM TOOTH EXTRACTION         FAMILY HISTORY  Family History   Problem Relation Age of Onset    No Known Problems Mother     No Known Problems Father     Breast cancer Neg Hx     Ovarian cancer Neg Hx     Uterine cancer Neg Hx     Colon cancer Neg Hx     Deep vein thrombosis Neg Hx     Pulmonary embolism Neg Hx        SOCIAL HISTORY  Social History     Socioeconomic History    Marital status:      Spouse name: Papa    Number of children: 1   Tobacco Use    Smoking status: Never    Smokeless tobacco: Never   Vaping Use    Vaping Use: Never used   Substance and Sexual Activity    Alcohol use: Not Currently     Comment: social    Drug use: No    Sexual activity: Yes     Partners: Male     Birth control/protection: None       ALLERGIES  Patient has no known allergies.    The patient's allergies have been reviewed    REVIEW OF SYSTEMS  All systems reviewed and negative except for those discussed in HPI.     PHYSICAL EXAM  I have reviewed the triage vital signs and nursing notes.  ED Triage Vitals [23 0148]   Temp Heart Rate Resp BP SpO2   98.4 °F (36.9 °C) 64 (!) 30 93/61 95 %      Temp src Heart Rate Source Patient Position BP Location FiO2 (%)   Oral -- -- -- --       General: No acute distress.  HENT: NCAT, PERRL, Nares patent.  Eyes: no scleral icterus.  Neck: trachea midline, no ROM limitations.  CV: regular rhythm, regular rate.  Respiratory: normal effort, CTAB.  Abdomen: soft, nondistended, upper abdominal tenderness to palpation, greatest in the epigastrium, no rebound tenderness, no guarding or rigidity.  Musculoskeletal: no deformity.  Neuro: alert, moves all extremities, follows commands.  Skin: warm, dry.    LAB  RESULTS  Recent Results (from the past 24 hour(s))   Comprehensive Metabolic Panel    Collection Time: 09/17/23  2:15 AM    Specimen: Blood   Result Value Ref Range    Glucose 105 (H) 65 - 99 mg/dL    BUN 11 6 - 20 mg/dL    Creatinine 0.56 (L) 0.57 - 1.00 mg/dL    Sodium 140 136 - 145 mmol/L    Potassium 4.0 3.5 - 5.2 mmol/L    Chloride 106 98 - 107 mmol/L    CO2 21.0 (L) 22.0 - 29.0 mmol/L    Calcium 9.0 8.6 - 10.5 mg/dL    Total Protein 7.2 6.0 - 8.5 g/dL    Albumin 4.7 3.5 - 5.2 g/dL    ALT (SGPT) 34 (H) 1 - 33 U/L    AST (SGOT) 41 (H) 1 - 32 U/L    Alkaline Phosphatase 60 39 - 117 U/L    Total Bilirubin <0.2 0.0 - 1.2 mg/dL    Globulin 2.5 gm/dL    A/G Ratio 1.9 g/dL    BUN/Creatinine Ratio 19.6 7.0 - 25.0    Anion Gap 13.0 5.0 - 15.0 mmol/L    eGFR 124.5 >60.0 mL/min/1.73   Lipase    Collection Time: 09/17/23  2:15 AM    Specimen: Blood   Result Value Ref Range    Lipase 14 13 - 60 U/L   hCG, Serum, Qualitative    Collection Time: 09/17/23  2:15 AM    Specimen: Blood   Result Value Ref Range    HCG Qualitative Negative Negative   Green Top (Gel)    Collection Time: 09/17/23  2:15 AM   Result Value Ref Range    Extra Tube Hold for add-ons.    Lavender Top    Collection Time: 09/17/23  2:15 AM   Result Value Ref Range    Extra Tube hold for add-on    Light Blue Top    Collection Time: 09/17/23  2:15 AM   Result Value Ref Range    Extra Tube Hold for add-ons.    CBC Auto Differential    Collection Time: 09/17/23  2:15 AM    Specimen: Blood   Result Value Ref Range    WBC 9.80 3.40 - 10.80 10*3/mm3    RBC 4.13 3.77 - 5.28 10*6/mm3    Hemoglobin 13.4 12.0 - 15.9 g/dL    Hematocrit 39.3 34.0 - 46.6 %    MCV 95.2 79.0 - 97.0 fL    MCH 32.4 26.6 - 33.0 pg    MCHC 34.1 31.5 - 35.7 g/dL    RDW 12.4 12.3 - 15.4 %    RDW-SD 42.9 37.0 - 54.0 fl    MPV 10.6 6.0 - 12.0 fL    Platelets 280 140 - 450 10*3/mm3    Neutrophil % 76.0 42.7 - 76.0 %    Lymphocyte % 18.2 (L) 19.6 - 45.3 %    Monocyte % 4.9 (L) 5.0 - 12.0 %     Eosinophil % 0.2 (L) 0.3 - 6.2 %    Basophil % 0.2 0.0 - 1.5 %    Immature Grans % 0.5 0.0 - 0.5 %    Neutrophils, Absolute 7.45 (H) 1.70 - 7.00 10*3/mm3    Lymphocytes, Absolute 1.78 0.70 - 3.10 10*3/mm3    Monocytes, Absolute 0.48 0.10 - 0.90 10*3/mm3    Eosinophils, Absolute 0.02 0.00 - 0.40 10*3/mm3    Basophils, Absolute 0.02 0.00 - 0.20 10*3/mm3    Immature Grans, Absolute 0.05 0.00 - 0.05 10*3/mm3    nRBC 0.0 0.0 - 0.2 /100 WBC   Ethanol    Collection Time: 09/17/23  2:15 AM    Specimen: Blood   Result Value Ref Range    Ethanol 164 (H) 0 - 10 mg/dL    Ethanol % 0.164 %   Lactate Dehydrogenase    Collection Time: 09/17/23  2:15 AM    Specimen: Blood   Result Value Ref Range     135 - 214 U/L   Urinalysis With Microscopic If Indicated (No Culture) - Urine, Clean Catch    Collection Time: 09/17/23  3:07 AM    Specimen: Urine, Clean Catch   Result Value Ref Range    Color, UA Yellow Yellow, Straw    Appearance, UA Clear Clear    pH, UA 7.0 5.0 - 8.0    Specific Gravity, UA 1.023 1.005 - 1.030    Glucose, UA Negative Negative    Ketones, UA 15 mg/dL (1+) (A) Negative    Bilirubin, UA Negative Negative    Blood, UA Negative Negative    Protein, UA Negative Negative    Leuk Esterase, UA Negative Negative    Nitrite, UA Negative Negative    Urobilinogen, UA 1.0 E.U./dL 0.2 - 1.0 E.U./dL       I ordered the above labs and reviewed the results.    RADIOLOGY  CT Abdomen Pelvis With Contrast    Result Date: 9/17/2023  CT OF THE ABDOMEN AND PELVIS WITH CONTRAST  HISTORY: Upper abdominal pain  COMPARISON: None available.  TECHNIQUE: Axial CT imaging was obtained through the abdomen and pelvis. IV contrast was administered.  FINDINGS: Images through the lung bases are clear. No suspicious hepatic lesions are seen. The spleen, adrenal glands, pancreas, duodenum, and gallbladder are normal. The patient's stomach does appear to be distended with some debris. The kidneys enhance symmetrically. There is no  hydronephrosis. There is a tiny simple appearing right renal cyst. No additional follow-up is necessary. No distal ureteral or bladder stones are seen. Intrauterine device is noted. It appears to be appropriately positioned. Urinary bladder is contracted. There is no bowel obstruction. Increased stool burden is seen throughout the colon, which could reflect some constipation. There is a right ovarian cyst, which measures approximately 1.3 cm. The appendix is not visualized. I do not see a dilated tubular structure within the right lower quadrant to suggest acute appendicitis. No acute osseous abnormalities are seen.       1. The patient's stomach does appear somewhat distended with fluid and debris, which could reflect some gastritis and associated ileus. 2. Increased stool burden throughout the colon may reflect constipation.  Radiation dose reduction techniques were utilized, including automated exposure control and exposure modulation based on body size.   This report was finalized on 9/17/2023 3:43 AM by Dr. Liliam Florez M.D.       I ordered the above noted radiological studies. I reviewed the images and results. I agree with the radiologist interpretation.    PROCEDURES  Procedures    MEDICATIONS GIVEN IN ER  Medications   sodium chloride 0.9 % flush 10 mL (has no administration in time range)   sodium chloride 0.9 % bolus 1,000 mL (1,000 mL Intravenous New Bag 9/17/23 0304)   sodium chloride 0.9 % bolus 1,000 mL (1,000 mL Intravenous New Bag 9/17/23 0348)   ondansetron (ZOFRAN) injection 8 mg (8 mg Intravenous Given 9/17/23 0305)   morphine injection 4 mg (4 mg Intravenous Given 9/17/23 0305)   iopamidol (ISOVUE-300) 61 % injection 100 mL (85 mL Intravenous Given by Other 9/17/23 0317)   famotidine (PEPCID) injection 20 mg (20 mg Intravenous Given 9/17/23 0348)       PROGRESS, DATA ANALYSIS, CONSULTS, AND MEDICAL DECISION MAKING  A complete history and physical exam have been performed.  All available  laboratory and imaging results have been reviewed by myself prior to disposition.    MDM    After the initial H&P, I discussed pertinent information from history and physical exam with patient/family.  Discussed differential diagnosis.  Discussed plan for ED evaluation/workup/treatment.  All questions answered.  Patient/family is agreeable with plan.  ED Course as of 09/17/23 0400   Sun Sep 17, 2023   9450 My differential diagnosis for abdominal pain includes but is not limited to:  Gastritis, gastroenteritis, peptic ulcer disease, GERD, esophageal perforation, acute appendicitis, mesenteric adenitis, Meckel's diverticulum, epiploic appendagitis, diverticulitis, colon cancer, ulcerative colitis, Crohn's disease, intussusception, small bowel obstruction, adhesions, ischemic bowel, perforated viscus, ileus, obstipation, biliary colic, cholecystitis, cholelithiasis, Jonny-Surendra Dariel, hepatitis, pancreatitis, common bile duct obstruction, cholangitis, bile leak, splenic trauma, splenic rupture, splenic infarction, splenic abscess, abdominal abscess, ascites, spontaneous bacterial peritonitis, hernia, UTI, cystitis, prostatitis, ureterolithiasis, urinary obstruction, ovarian cyst, torsion, pregnancy, ectopic pregnancy, PID, pelvic abscess, mittelschmerz, endometriosis, AAA, myocardial infarction, pneumonia, cancer, porphyria, DKA, medications, sickle cell, viral syndrome, zoster   [JG]   0357 Patient reassessed, discussed ED work-up and results.  Offered admission for further evaluation and treatment.  Patient reports that she has had symptomatic improvement, will refer her discharge.  Patient's significant other is here with her, is comfortable taking patient home.  Discussed need for close follow-up with primary care, given extensive discussion return precautions, discharging. [JG]      ED Course User Index  [JG] Papa Ag MD       AS OF 04:00 EDT VITALS:    BP - 95/64  HR - 73  TEMP - 98.4 °F (36.9 °C)  (Oral)  O2 SATS - 99%    DIAGNOSIS  Final diagnoses:   Upper abdominal pain   Nausea and vomiting, unspecified vomiting type   Acute alcoholic intoxication without complication   Elevated liver enzymes   Acute gastritis without hemorrhage, unspecified gastritis type         DISPOSITION  DISCHARGE    Patient discharged in stable condition.    Reviewed implications of results, diagnosis, meds, responsibility to follow up, warning signs and symptoms of possible worsening, potential complications and reasons to return to ER.    Patient/Family voiced understanding of above instructions.    Discussed plan for discharge. Patient referred to primary care provider for BP management due to today's BP. Pt/family is agreeable and understands need for follow up and repeat testing.  Pt is aware that discharge does not mean that nothing is wrong but it indicates no emergency is present that requires admission and they must continue care with follow-up as given below or physician of their choice.     FOLLOW-UP  Your PCP    Schedule an appointment as soon as possible for a visit in 2 days  even if well    [facility]IENT CONNECTION - [place] [id number] [phone]    if you are unable to follow up with your PCP         Medication List        New Prescriptions      ondansetron ODT 8 MG disintegrating tablet  Commonly known as: ZOFRAN-ODT  Place 1 tablet on the tongue Every 8 (Eight) Hours As Needed for Nausea or Vomiting.     pantoprazole 40 MG EC tablet  Commonly known as: PROTONIX  Take 1 tablet by mouth Daily for 14 days.     sucralfate 1 g tablet  Commonly known as: CARAFATE  Take 1 tablet by mouth 4 (Four) Times a Day.               Where to Get Your Medications        These medications were sent to [facility]/pharmacy #[id number] - [facility], [place] - [address] AT [facility] [phone] [facility] [phone]   [address], [facility] [address]      Phone: [phone]   ondansetron ODT 8 MG  disintegrating tablet  pantoprazole 40 MG EC tablet  sucralfate 1 g tablet            Papa Ag MD  09/17/23 5129

## 2023-09-17 NOTE — ED TRIAGE NOTES
Pt per ems from home after reports was laying in bed approx 0030 with severe epigastric pain with nausea and vomiting.  Reports [place] at festival earlier [place] and had alcohol.

## (undated) DEVICE — SUT VIC 0 CTX 36IN J978H

## (undated) DEVICE — APPL CHLORAPREP W/TINT 26ML ORNG

## (undated) DEVICE — ANTIBACTERIAL UNDYED BRAIDED (POLYGLACTIN 910), SYNTHETIC ABSORBABLE SUTURE: Brand: COATED VICRYL

## (undated) DEVICE — PREP SOL POVIDONE/IODINE BT 4OZ

## (undated) DEVICE — PK C/SECT 40

## (undated) DEVICE — SUCTION CANISTER, 1000CC,SAFELINER: Brand: DEROYAL

## (undated) DEVICE — GLV SURG SENSICARE W/ALOE PF LF 7.5 STRL

## (undated) DEVICE — TRY SKINPREP DRYPREP

## (undated) DEVICE — HYDROGEL COATED LATEX URINE METER FOLEY TRAY,16 FR/CH (5.3 MM), 5 ML CATHETER PRE-CONNECTED TO 2000 ML DRAINAGE BAG WITH NEEDLE SAMPLING: Brand: DOVER

## (undated) DEVICE — SOL IRR H2O BTL 1000ML STRL